# Patient Record
Sex: MALE | Race: WHITE | NOT HISPANIC OR LATINO | Employment: UNEMPLOYED | URBAN - METROPOLITAN AREA
[De-identification: names, ages, dates, MRNs, and addresses within clinical notes are randomized per-mention and may not be internally consistent; named-entity substitution may affect disease eponyms.]

---

## 2023-06-09 ENCOUNTER — APPOINTMENT (OUTPATIENT)
Dept: ULTRASOUND IMAGING | Facility: CLINIC | Age: 23
End: 2023-06-09
Attending: EMERGENCY MEDICINE
Payer: COMMERCIAL

## 2023-06-09 ENCOUNTER — APPOINTMENT (OUTPATIENT)
Dept: GENERAL RADIOLOGY | Facility: CLINIC | Age: 23
End: 2023-06-09
Attending: EMERGENCY MEDICINE
Payer: COMMERCIAL

## 2023-06-09 ENCOUNTER — APPOINTMENT (OUTPATIENT)
Dept: CT IMAGING | Facility: CLINIC | Age: 23
End: 2023-06-09
Attending: EMERGENCY MEDICINE
Payer: COMMERCIAL

## 2023-06-09 ENCOUNTER — HOSPITAL ENCOUNTER (INPATIENT)
Facility: CLINIC | Age: 23
LOS: 3 days | Discharge: HOME | End: 2023-06-13
Attending: EMERGENCY MEDICINE | Admitting: PSYCHIATRY & NEUROLOGY
Payer: COMMERCIAL

## 2023-06-09 DIAGNOSIS — S12.691A OTHER CLOSED NONDISPLACED FRACTURE OF SEVENTH CERVICAL VERTEBRA, INITIAL ENCOUNTER (H): ICD-10-CM

## 2023-06-09 DIAGNOSIS — F10.20 ALCOHOL USE DISORDER, SEVERE, DEPENDENCE (H): ICD-10-CM

## 2023-06-09 DIAGNOSIS — J34.89 DRY NOSE: ICD-10-CM

## 2023-06-09 DIAGNOSIS — F41.1 GAD (GENERALIZED ANXIETY DISORDER): ICD-10-CM

## 2023-06-09 DIAGNOSIS — F10.931 ALCOHOL WITHDRAWAL SYNDROME, WITH DELIRIUM (H): ICD-10-CM

## 2023-06-09 DIAGNOSIS — F51.05 INSOMNIA DUE TO MENTAL DISORDER: ICD-10-CM

## 2023-06-09 DIAGNOSIS — F33.1 MAJOR DEPRESSIVE DISORDER, RECURRENT EPISODE, MODERATE (H): Primary | ICD-10-CM

## 2023-06-09 DIAGNOSIS — F17.200 NICOTINE USE DISORDER: ICD-10-CM

## 2023-06-09 DIAGNOSIS — Z11.52 ENCOUNTER FOR SCREENING LABORATORY TESTING FOR SEVERE ACUTE RESPIRATORY SYNDROME CORONAVIRUS 2 (SARS-COV-2): ICD-10-CM

## 2023-06-09 DIAGNOSIS — R52 PAIN: ICD-10-CM

## 2023-06-09 DIAGNOSIS — K29.21 ALCOHOLIC GASTRITIS WITH HEMORRHAGE, UNSPECIFIED CHRONICITY: ICD-10-CM

## 2023-06-09 DIAGNOSIS — K13.79 MOUTH SORE: ICD-10-CM

## 2023-06-09 LAB
ABO/RH(D): NORMAL
ALBUMIN SERPL BCG-MCNC: 4.9 G/DL (ref 3.5–5.2)
ALCOHOL BREATH TEST: 0 (ref 0–0.01)
ALP SERPL-CCNC: 89 U/L (ref 40–129)
ALT SERPL W P-5'-P-CCNC: 13 U/L (ref 10–50)
AMPHETAMINES UR QL SCN: ABNORMAL
ANION GAP SERPL CALCULATED.3IONS-SCNC: 14 MMOL/L (ref 7–15)
ANTIBODY SCREEN: NEGATIVE
AST SERPL W P-5'-P-CCNC: 18 U/L (ref 10–50)
BARBITURATES UR QL SCN: ABNORMAL
BASOPHILS # BLD AUTO: 0 10E3/UL (ref 0–0.2)
BASOPHILS NFR BLD AUTO: 0 %
BENZODIAZ UR QL SCN: ABNORMAL
BILIRUB SERPL-MCNC: 0.3 MG/DL
BUN SERPL-MCNC: 9.9 MG/DL (ref 6–20)
BZE UR QL SCN: ABNORMAL
CALCIUM SERPL-MCNC: 9.7 MG/DL (ref 8.6–10)
CANNABINOIDS UR QL SCN: ABNORMAL
CHLORIDE SERPL-SCNC: 102 MMOL/L (ref 98–107)
CREAT SERPL-MCNC: 0.65 MG/DL (ref 0.67–1.17)
DEPRECATED HCO3 PLAS-SCNC: 23 MMOL/L (ref 22–29)
EOSINOPHIL # BLD AUTO: 0.1 10E3/UL (ref 0–0.7)
EOSINOPHIL NFR BLD AUTO: 2 %
ERYTHROCYTE [DISTWIDTH] IN BLOOD BY AUTOMATED COUNT: 11.8 % (ref 10–15)
FLUAV RNA SPEC QL NAA+PROBE: NEGATIVE
FLUBV RNA RESP QL NAA+PROBE: NEGATIVE
GFR SERPL CREATININE-BSD FRML MDRD: >90 ML/MIN/1.73M2
GLUCOSE SERPL-MCNC: 147 MG/DL (ref 70–99)
HCT VFR BLD AUTO: 47.1 % (ref 40–53)
HGB BLD-MCNC: 16.2 G/DL (ref 13.3–17.7)
IMM GRANULOCYTES # BLD: 0 10E3/UL
IMM GRANULOCYTES NFR BLD: 0 %
INR PPP: 1.07 (ref 0.85–1.15)
LIPASE SERPL-CCNC: 18 U/L (ref 13–60)
LYMPHOCYTES # BLD AUTO: 2.2 10E3/UL (ref 0.8–5.3)
LYMPHOCYTES NFR BLD AUTO: 23 %
MAGNESIUM SERPL-MCNC: 2 MG/DL (ref 1.7–2.3)
MCH RBC QN AUTO: 32.3 PG (ref 26.5–33)
MCHC RBC AUTO-ENTMCNC: 34.4 G/DL (ref 31.5–36.5)
MCV RBC AUTO: 94 FL (ref 78–100)
MONOCYTES # BLD AUTO: 0.5 10E3/UL (ref 0–1.3)
MONOCYTES NFR BLD AUTO: 5 %
NEUTROPHILS # BLD AUTO: 6.6 10E3/UL (ref 1.6–8.3)
NEUTROPHILS NFR BLD AUTO: 70 %
NRBC # BLD AUTO: 0 10E3/UL
NRBC BLD AUTO-RTO: 0 /100
OPIATES UR QL SCN: ABNORMAL
PLAT MORPH BLD: NORMAL
PLATELET # BLD AUTO: 218 10E3/UL (ref 150–450)
POTASSIUM SERPL-SCNC: 3.4 MMOL/L (ref 3.4–5.3)
PROT SERPL-MCNC: 8.2 G/DL (ref 6.4–8.3)
RBC # BLD AUTO: 5.02 10E6/UL (ref 4.4–5.9)
RBC MORPH BLD: NORMAL
RSV RNA SPEC NAA+PROBE: NEGATIVE
SARS-COV-2 RNA RESP QL NAA+PROBE: NEGATIVE
SODIUM SERPL-SCNC: 139 MMOL/L (ref 136–145)
SPECIMEN EXPIRATION DATE: NORMAL
WBC # BLD AUTO: 9.6 10E3/UL (ref 4–11)

## 2023-06-09 PROCEDURE — 80307 DRUG TEST PRSMV CHEM ANLYZR: CPT | Performed by: EMERGENCY MEDICINE

## 2023-06-09 PROCEDURE — 83690 ASSAY OF LIPASE: CPT | Performed by: EMERGENCY MEDICINE

## 2023-06-09 PROCEDURE — 72072 X-RAY EXAM THORAC SPINE 3VWS: CPT

## 2023-06-09 PROCEDURE — 86850 RBC ANTIBODY SCREEN: CPT | Performed by: EMERGENCY MEDICINE

## 2023-06-09 PROCEDURE — 72125 CT NECK SPINE W/O DYE: CPT

## 2023-06-09 PROCEDURE — 76705 ECHO EXAM OF ABDOMEN: CPT

## 2023-06-09 PROCEDURE — 72100 X-RAY EXAM L-S SPINE 2/3 VWS: CPT

## 2023-06-09 PROCEDURE — 99207 PR SERVICE NOT STAFFED W/SUPERV PROV: CPT | Performed by: NEUROLOGICAL SURGERY

## 2023-06-09 PROCEDURE — 85610 PROTHROMBIN TIME: CPT | Performed by: EMERGENCY MEDICINE

## 2023-06-09 PROCEDURE — 96365 THER/PROPH/DIAG IV INF INIT: CPT

## 2023-06-09 PROCEDURE — 80053 COMPREHEN METABOLIC PANEL: CPT | Performed by: EMERGENCY MEDICINE

## 2023-06-09 PROCEDURE — 83735 ASSAY OF MAGNESIUM: CPT | Performed by: EMERGENCY MEDICINE

## 2023-06-09 PROCEDURE — 71046 X-RAY EXAM CHEST 2 VIEWS: CPT

## 2023-06-09 PROCEDURE — 82075 ASSAY OF BREATH ETHANOL: CPT

## 2023-06-09 PROCEDURE — 99285 EMERGENCY DEPT VISIT HI MDM: CPT | Performed by: EMERGENCY MEDICINE

## 2023-06-09 PROCEDURE — 99285 EMERGENCY DEPT VISIT HI MDM: CPT | Mod: 25

## 2023-06-09 PROCEDURE — C9803 HOPD COVID-19 SPEC COLLECT: HCPCS

## 2023-06-09 PROCEDURE — 90791 PSYCH DIAGNOSTIC EVALUATION: CPT

## 2023-06-09 PROCEDURE — 93005 ELECTROCARDIOGRAM TRACING: CPT

## 2023-06-09 PROCEDURE — 70450 CT HEAD/BRAIN W/O DYE: CPT

## 2023-06-09 PROCEDURE — 86901 BLOOD TYPING SEROLOGIC RH(D): CPT | Performed by: EMERGENCY MEDICINE

## 2023-06-09 PROCEDURE — 96375 TX/PRO/DX INJ NEW DRUG ADDON: CPT

## 2023-06-09 PROCEDURE — 250N000011 HC RX IP 250 OP 636: Performed by: EMERGENCY MEDICINE

## 2023-06-09 PROCEDURE — 85025 COMPLETE CBC W/AUTO DIFF WBC: CPT | Performed by: EMERGENCY MEDICINE

## 2023-06-09 PROCEDURE — C9113 INJ PANTOPRAZOLE SODIUM, VIA: HCPCS | Performed by: EMERGENCY MEDICINE

## 2023-06-09 PROCEDURE — 36415 COLL VENOUS BLD VENIPUNCTURE: CPT | Performed by: EMERGENCY MEDICINE

## 2023-06-09 PROCEDURE — 87637 SARSCOV2&INF A&B&RSV AMP PRB: CPT | Performed by: EMERGENCY MEDICINE

## 2023-06-09 PROCEDURE — 250N000009 HC RX 250: Performed by: EMERGENCY MEDICINE

## 2023-06-09 PROCEDURE — 250N000013 HC RX MED GY IP 250 OP 250 PS 637: Performed by: EMERGENCY MEDICINE

## 2023-06-09 RX ORDER — DIAZEPAM 5 MG
5-20 TABLET ORAL EVERY 30 MIN PRN
Status: DISCONTINUED | OUTPATIENT
Start: 2023-06-09 | End: 2023-06-10

## 2023-06-09 RX ADMIN — DIAZEPAM 10 MG: 5 TABLET ORAL at 17:15

## 2023-06-09 RX ADMIN — NICOTINE POLACRILEX 4 MG: 4 GUM, CHEWING BUCCAL at 17:48

## 2023-06-09 RX ADMIN — FOLIC ACID: 5 INJECTION, SOLUTION INTRAMUSCULAR; INTRAVENOUS; SUBCUTANEOUS at 17:48

## 2023-06-09 RX ADMIN — PANTOPRAZOLE SODIUM 40 MG: 40 INJECTION, POWDER, FOR SOLUTION INTRAVENOUS at 17:15

## 2023-06-09 RX ADMIN — NICOTINE POLACRILEX 4 MG: 4 GUM, CHEWING BUCCAL at 19:40

## 2023-06-09 ASSESSMENT — COLUMBIA-SUICIDE SEVERITY RATING SCALE - C-SSRS
LETHALITY/MEDICAL DAMAGE CODE MOST LETHAL ACTUAL ATTEMPT: MINOR PHYSICAL DAMAGE
1. HAVE YOU WISHED YOU WERE DEAD OR WISHED YOU COULD GO TO SLEEP AND NOT WAKE UP?: YES
2. HAVE YOU ACTUALLY HAD ANY THOUGHTS OF KILLING YOURSELF?: YES
5. HAVE YOU STARTED TO WORK OUT OR WORKED OUT THE DETAILS OF HOW TO KILL YOURSELF? DO YOU INTEND TO CARRY OUT THIS PLAN?: YES
ATTEMPT LIFETIME: YES
LETHALITY/MEDICAL DAMAGE CODE MOST RECENT POTENTIAL ATTEMPT: BEHAVIOR LIKELY TO RESULT IN INJURY BUT NOT LIKELY TO CAUSE DEATH
LETHALITY/MEDICAL DAMAGE CODE FIRST POTENTIAL ATTEMPT: BEHAVIOR LIKELY TO RESULT IN INJURY BUT NOT LIKELY TO CAUSE DEATH
REASONS FOR IDEATION PAST MONTH: DOES NOT APPLY
ATTEMPT PAST THREE MONTHS: NO
2. HAVE YOU ACTUALLY HAD ANY THOUGHTS OF KILLING YOURSELF?: YES
LETHALITY/MEDICAL DAMAGE CODE FIRST ACTUAL ATTEMPT: MINOR PHYSICAL DAMAGE
4. HAVE YOU HAD THESE THOUGHTS AND HAD SOME INTENTION OF ACTING ON THEM?: YES
5. HAVE YOU STARTED TO WORK OUT OR WORKED OUT THE DETAILS OF HOW TO KILL YOURSELF? DO YOU INTEND TO CARRY OUT THIS PLAN?: NO
3. HAVE YOU BEEN THINKING ABOUT HOW YOU MIGHT KILL YOURSELF?: YES
LETHALITY/MEDICAL DAMAGE CODE MOST RECENT ACTUAL ATTEMPT: MINOR PHYSICAL DAMAGE
1. IN THE PAST MONTH, HAVE YOU WISHED YOU WERE DEAD OR WISHED YOU COULD GO TO SLEEP AND NOT WAKE UP?: YES
REASONS FOR IDEATION LIFETIME: MOSTLY TO END OR STOP THE PAIN (YOU COULDN'T GO ON LIVING WITH THE PAIN OR HOW YOU WERE FEELING)
6. HAVE YOU EVER DONE ANYTHING, STARTED TO DO ANYTHING, OR PREPARED TO DO ANYTHING TO END YOUR LIFE?: NO
TOTAL  NUMBER OF INTERRUPTED ATTEMPTS LIFETIME: NO
TOTAL  NUMBER OF ABORTED OR SELF INTERRUPTED ATTEMPTS LIFETIME: NO
4. HAVE YOU HAD THESE THOUGHTS AND HAD SOME INTENTION OF ACTING ON THEM?: NO
LETHALITY/MEDICAL DAMAGE CODE MOST LETHAL POTENTIAL ATTEMPT: BEHAVIOR LIKELY TO RESULT IN INJURY BUT NOT LIKELY TO CAUSE DEATH

## 2023-06-09 ASSESSMENT — ACTIVITIES OF DAILY LIVING (ADL)
ADLS_ACUITY_SCORE: 33
ADLS_ACUITY_SCORE: 35
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
## 2023-06-09 NOTE — ED TRIAGE NOTES
Triage Assessment     Row Name 06/09/23 1600       Triage Assessment (Adult)    Airway WDL WDL       Respiratory WDL    Respiratory WDL WDL       Skin Circulation/Temperature WDL    Skin Circulation/Temperature WDL WDL       Cardiac WDL    Cardiac WDL WDL       Peripheral/Neurovascular WDL    Peripheral Neurovascular WDL WDL       Cognitive/Neuro/Behavioral WDL    Cognitive/Neuro/Behavioral WDL WDL

## 2023-06-09 NOTE — ED PROVIDER NOTES
[facility] ([facility])    6/09/23      ED PROVIDER NOTE   Hallway E   History     Chief Complaint   Patient presents with     Alcohol Intoxication     Pt is interested of detox. Pt 's last drink was last night  around 12:30 am or 1 am. Pt drank beer last night. Pt has history of passing out last year.      HPI  Sahil Tejeda is a 23 year old male with history of alcohol use, homelessness, prior chlamydia infection who presents seeking alcohol detox.  Patient has been drinking alcohol on a daily basis.  Patient states that he last drank last night around 12:30 AM-1 AM.     Epic records reviewed, he did have [facility] evaluation on 6/6/2023, 3 days ago for sore throat and STI check.  Patient was diagnosed with community-acquired pneumonia of the left lung with sore throat.  Patient was prescribed Levaquin 750 mg daily x5-day course (to be completed by 6/11/2023), albuterol nebulizer, acetaminophen, saline nasal spray.    Sahil Tejeda is a 23-year-old male with history of alcohol dependence who presents to the emergency department seeking detox.  He reports he has been drinking approximately eight 40 ounce beers a day.  He last drank last night.  He has been drinking on a daily basis for approximately 4 years.  He denies any history of withdrawal seizures or hallucinations.  He does report he is smoking marijuana regularly as well.  Denies any IV drug use.  He did have a fall approximately 2 weeks ago when he was in a tree and fell out of the tree onto his head.  He fell approximately 6-7 feet. He did not have a loss of consciousness. He does report an ongoing headache, nausea and vomiting, along with generalized neck discomfort. He reports chronic back pain and has not had significant changes.   He denies any extremity pain from the fall.  He does report over the last 2 weeks he has had a worsening cough and was seen in clinic and diagnosed with pneumonia.  He was  "started on levofloxacin.  He denies any recent fevers or chest pain.  He does report some slight shortness of breath.  Additionally he reports chronic abdominal pain that has been present for a number of years.  Pain is generalized, significant in the upper abdomen.  He does have a history of ulcers and prior GI bleed.  He reports intermittent dark stools for the last few years.  He is not anticoagulated.  He denies any NSAID use.  Additionally he reports depression and anxiety.  He has had intermittent receiving he has had longstanding thoughts of wanting to harm himself does not have any specific plan in mind.      This part of the medical record was transcribed by Adolfo Mcmillan, Medical Scribe, from a dictation done by Darcie Foote MD.       Past Medical History  No past medical history on file.  No past surgical history on file.  No current outpatient medications on file.    Allergies   Allergen Reactions     Omnicef [Cefdinir] Hives     Family History  No family history on file.  Social History       Past medical history, past surgical history, medications, allergies, family history, and social history were reviewed with the patient. No additional pertinent items.        Physical Exam   BP: 136/84  Pulse: 111  Temp: 98.6  F (37  C)  Resp: 16  Height: 188 cm (6' 2\")  SpO2: 94 %  Physical Exam  General: patient is alert and oriented and in no acute distress   Head: atraumatic and normocephalic   EENT: moist mucus membranes without tonsillar erythema or exudates, pupils 3 mm, equal round and reactive, extraocular movements intact  Neck: supple, very slight lower cervical spine tenderness to palpation  Cardiovascular: regular rate and rhythm, extremities warm and well perfused, no lower extremity edema  Pulmonary: Coarse breath sounds on the left  Abdomen: soft, epigastric tenderness to palpation without rebound or guarding  Musculoskeletal: normal range of motion, generalized tenderness to palpation of the thoracic " and lumbar spine, no point tenderness to palpation of the extremities  Neurological: alert and oriented, moving all extremities symmetrically, CN II-XII intact, strength 5/5 and symmetric in , elbow flexion/extension, hip flexion/extension, knee flexion/extension and ankle plantar/dorsiflexion, sensation to light touch in distal upper and lower extremities intact, normal gait  Skin: warm, dry     ED Course, Procedures, & Data      Procedures       ED Course Selections:        EKG Interpretation:      Interpreted by Darcie Monae MD  Time reviewed: 1648  Symptoms at time of EKG: None   Rhythm: normal sinus   Rate: Normal  Axis: Right Axis Deviation  Ectopy: none  Conduction: right bundle branch block (incomplete)  ST Segments/ T Waves: No acute ischemic changes  Q Waves: none  Comparison to prior: No old EKG available    Clinical Impression: non-specific EKG                           No results found for any visits on 06/09/23.  Medications - No data to display  Labs Ordered and Resulted from Time of ED Arrival to Time of ED Departure - No data to display  No orders to display          Critical care was not performed.     Medical Decision Making  The patient's presentation was of high complexity (a chronic illness severe exacerbation, progression, or side effect of treatment).    The patient's evaluation involved:  ordering and/or review of 3+ test(s) in this encounter (see separate area of note for details)  discussion of management or test interpretation with another health professional (neurosurgery,trauma surgery)    The patient's management necessitated moderate risk (prescription drug management including medications given in the ED) and high risk (a decision regarding hospitalization).      Assessment & Plan    Sahil Tejeda is a 23-year-old male with history of alcohol dependence who presents to the emergency department seeking detox.  He has noted to be tachycardic, symptomatically stable,  afebrile and in no respiratory distress.  Alcohol level is currently 0.  He was started on MSSA protocol.  He was given a banana bag.  Additionally he reports a recent 6 to 7 foot fall from a tree.  He did have a CT head and C-spine as well as x-rays of the thoracic spine.  No other point bony tenderness affects his extremities.  On exam he is neurologically intact.  CT head is unremarkable.  CT of the cervical spine does show a C7 fracture.  Patient was seen and evaluated by neurosurgery and recommended cervical MRI.  MRI obtained.  Neurosurgery has recommended that he remain in the cervical collar during the day for comfort.  He remove the collar when sleeping and eating.  X-rays of the thoracic and lumbar spine are unremarkable.  Discussed with trauma surgery and recommended considering CTA however on discussion with neurosurgery is not needed based on the location of the fracture.  Trauma surgery has not recommended any further work-up.    He reports a worsening cough recently and was diagnosed with pneumonia.  His chest x-ray today shows no focal infiltrate.  Influenza and COVID are negative.  He has no leukocytosis.  He is not hypoxic and in no respiratory distress.  He was started on levofloxacin 2 days ago and will plan to continue his oral antibiotic.      Additionally he reports constant abdominal pain for many months as well as dark stools.  His laboratory evaluation demonstrates normal hemoglobin of 16.2.  He has no elevation in his LFTs or lipase.  Right upper quadrant ultrasound is unremarkable.  He was given IV Protonix.  Low suspicion for significant GI bleed at this time.    Finally he reports depression, anxiety and suicidal ideation without a specific plan in mind.  He did have a [facility] evaluation and recommended proceeding with detox and chemical dependency resources as well as outpatient therapy.  And is able to contract for safety at this time.  He will be admitted voluntarily to detox for further  management.      This part of the medical record was transcribed by Adolfo Mcmillan Medical Scribe, from a dictation done by Darcie Foote MD.     I have reviewed the nursing notes. I have reviewed the findings, diagnosis, plan and need for follow up with the patient.    New Prescriptions    No medications on file       Final diagnoses:   Alcohol withdrawal syndrome, with delirium (H)   Other closed nondisplaced fracture of seventh cervical vertebra, initial encounter (H)       I, Adolfo Mcmillan, am serving as a trained medical scribe to document services personally performed by Darcie Foote MD based on the provider's statements to me on June 9, 2023.  This document has been checked and approved by the attending provider.    I, Darcie Foote MD, was physically present and have reviewed and verified the accuracy of this note documented by Adolfo Mcmillan medical scribe.      Darcie Foote MD  [facility] EMERGENCY DEPARTMENT  6/9/2023     Darcie Foote MD  06/10/23 0153

## 2023-06-09 NOTE — DISCHARGE INSTRUCTIONS
Behavioral Discharge Planning and Instructions  THANK YOU FOR CHOOSING THE [facility]  3A  [phone]    Summary: You were admitted to Station 3A on 6/9/2023 for detoxification from alcohol.  A medical exam was performed that included lab work. You have met with a  and opted to return home.  Please take care and make your recovery a priority, Sahil!    Recommendation:   Utilize resources listed below and develop a positive support system. Patients recommendation is to discharge home. Follow up with residential referrals listed below if the decision is to attend them. Below are 2 scheduled appointments for you.      Type: Teletherapy  Date: Wednesday, 6/14/2023    Time: 12:00 pm - 1:00 pm  Provider: Madhavi Haney  Location: [facility], [address] [place] [address] [address], [place] [address]  Phone: [phone]  NOTES:  Please call our at [phone] and confirm their appointment and leave their email.      Type: Telepsychiatry  Date: Thursday, 6/15/2023  Time: 1:00 pm - 2:00 pm  Provider: Refugio Armstrong  MSN  CNP,PMHNP,RN  Location: [facility], [address] [place] [address] Eduardo Chance, [place] [address]  Phone: [phone]  NOTES: You will be contacted by our office to set up the virtual meeting. If you have questions, please contact our office at [phone].      Main Diagnosis: Per Dr. Cr [place], [place]  303.90 (F10.20) Alcohol Use Disorder Severe    Major Treatments, Procedures and Findings:  You were detoxed from alcohol with the Modified Selective Severity Protocol using Valium. You have met with a  to develop a treatment plan for discharge.  You have had labs drawn and a copy of those labs will be sent home with you.  Please bring your lab results with to your follow up doctor appointment.    Symptoms to Report:  If you experience more anxiety, confusion, sleeplessness, deep sadness or thoughts of suicide, notify your treatment team or notify your  "primary care physician. IF ANY OF THE SYMPTOMS YOU ARE EXPERIENCING ARE A MEDICAL EMERGENCY CALL 911 IMMEDIATELY.     If you or someone you know is struggling or in crisis, help is available.  Call or text 248 or chat  at [employer]    Lifestyle Adjustment: Adjust your lifestyle to get enough sleep, relaxation, exercise and  good nutrition. Continue to develop healthy coping skills to decrease stress and promote a sober living environment. Do not use alcohol, illegal drugs or addictive medications other than what is currently prescribed. AA, NA, and  Sponsor are excellent resources for support.     Primary Provider:    Disposition: Home    Treatment Follow-Up: Below are a few program options to consider if you decide to pursue residential treatment. Contact them for next steps:   [facility]  Phone: [phone]  About: \"[facility] patients receive quality care for alcohol and/or drug use in a living environment that is conducive to their recovery. Our residents receive 30 hours of service per week, including group therapy, individual counseling, spiritual care and education sessions. Each patient is assigned to a primary counselor and therapy group for the duration of their stay. Shortly after admission to [facility], the patient will work with a counselor to develop a personalized treatment plan. This plan outlines treatment goals that, when reached, will determine the next steps in the recovery process.\"     [facility]  Email: [email or web address]  P: [phone] F: [phone]  About: [facility] ([facility]) is a behavioral health organization dedicated to helping people with mental health and substance use disorders Move Forward in Hope. [facility] provides Residential and Intensive Outpatient Co-Occurring Disorders Treatment, Individual and Intensive Outpatient (IOP) mental health counseling services, chemical dependency treatment, and sober housing.  " "    [facility]  Address: [facility] [place] [address]  Phone: [phone]  Fax: [phone]   About: \"[facility] s Program provides a safe, sober, structured environment for men while in the process of putting their lives back together. Residential clients receive comprehensive, intensive substance use and mental health treatment that includes one-on-one counseling, individualized treatment planning, group therapy, family programming, medication monitoring, case management, and community referrals.\"     Facts about COVID19 at www.cdc.gov/COVID19 and www.[place].gov/covid19    Keeping hands clean is one of the most important steps we can take to avoid getting sick and spreading germs to others.  Please wash your hands frequently and lather with soap for at least 20 seconds!    Follow-up Appointment:   Appointment Date/Time: 6/27 2:10PM    Psychiatrist/Primary Care Giver: [facility]    Address: [address] [place] [address] [address], [place] [address]    Phone Number: [phone]        Type: Teletherapy  Date: Wednesday, 6/14/2023    Time: 12:00 pm - 1:00 pm  Provider: Madhavi Haney  Location: [facility], [address] [place] [address]  Phone: [phone]      Type: Telepsychiatry  Date: Thursday, 6/15/2023  Time: 1:00 pm - 2:00 pm  Provider: Refugio Armstrong  MSN  CNP,PMHNP,RN  Location: [facility], [address] [place] [address] [place] [address]  Phone: [phone]    Resources:   Resources for on line recovery meetings:  AA meetings can be found online; search for them at: http://aa-intergroup.org/directory.php  AA meetings via Z[place]OM for [place] area can be found online at: https://a[place].org/find-a-meeting/holiday-closings/  NA meetings via ZOOM for [place] area can be found online at: https://sites.Stocard.com/view/mnregionofnarcoticsanonymous/home?authuser=2  Www.qualifyoren.org  has online resources for meeting and recovery care including " "Podcast \"Let's Talk:Addiction & Recovery Podcasts  Www.[place]recovery.org     DISCHARGE RESOURCES:  -SMART Recovery - self management for addiction recovery:  www.smartrecovery.org    -Pathways ~ A Health Crisis Resource & Support Center: 432.486.8280.  -[facility] 614-506-9418   -[facility] 621-577-6400 or 185-709-6035.  -Suicide Awareness Voices of Education (SAVE) (www.save.org): 143-318-ROOU (7423)  -National Suicide Prevention Line (www.mentalhealthmn.org): 068-174-DQBY (5625)  -National [place] on Mental Illness (www.[place].sai.org): 149.515.2468 or 296-727-2732.  -Tfak4flqj: text the word LIFE to 33683 for immediate support and crisis intervention  -Mental Health Consumer/Survivor Network of [place] (www.mhcsn.net): 993.519.5696 or 884-643-3635  -Mental Health Association of [place] (www.mentalhealth.org): 498.783.5238 or 288-714-4626     -Substance Abuse and Mental Health Services (www.samhsa.gov)  -Harm Reduction Coalition (www. Harmreduction.org)  -www.prescribetoprevent.org or http://prescribetoprevent.org/video  -Poison control 7-765-062-9328   **[place] Opioid Prevention Coalition: www.opioidcoalition.org    Sober Support Group Information:  AA/NA & Sponsor/Support  -Alcoholics Anonymous (www.alcoholics-anonymous.org): for local information 24 hours/day  -AA Intergroup service office in [place] (http://www.aastpaul.org/) 237.101.2480  -AA Intergroup service office in [facility]: [phone]. (http://www.aamin[place].org/)  -Narcotics Anonymous (www.naminn[place].org) (179) 919-7741   **Sober Fun Activities: www.soberFutura Medicalactivities.[employer]/[facility]//[facility] Recovery Connection (MRC)  [facility] connects people seeking recovery to resources that help foster and sustain long-term recovery.  Whether you are seeking resources for treatment, transportation, housing, job training, education, health care or other pathways to recovery, [facility] is a great place to " start.    Phone: (614) 394-3266.  www.minnesotaTickTickTickets.Vivino (Great listing of all types of recovery and non-recovery related resources)      General Medication Instructions:   See your medication sheet(s) for instructions.   Take all medicines as directed.  Make no changes unless your doctor suggests them.   Go to all your doctor visits.  Be sure to have all your required lab tests. This way, your medicines can be refilled on time.  Do not use any drugs not prescribed by your provider.  AA/NA and Sponsors are excellent resources for support  Avoid alcohol.    Any follow up concerns:  Nursing questions call the Unit -[facility] [phone]  Medical Record call [phone]  Outpatient Behavioral Intake call [phone]  LP+ Wait List/Bed Availability call [phone]    The entire treatment team has appreciated the opportunity to work with you Sahil.  We wish you the best in the future and with your lifelong recovery goals. Please bring this discharge folder with you to all follow up appointments.  It contains your lab results, diagnosis, medication list and discharge recommendations.    Food Resources:  There is a list of resources to local food shelves included in the patients property.    THANK YOU FOR CHOOSING THE [facility]

## 2023-06-09 NOTE — PLAN OF CARE
Sahil Tejeda  June 9, 2023  Plan of Care Hand-off Note     Patient Care Path: Detox    Plan for Care:     After therapeutic assessment and intervention by ED care team and LM and in consultation with attending provider, the patient's circumstances and mental state were appropriate for detox with MICD bed placement. Pt presents to [facility] ED this evening reporting daily heavy alcohol use for the past 4 years, approximately 8, 40 oz beers per day with his most recent drink being last night at approximately 12:30 am - 1 am. Denies hx of withdrawal seizures or DTs. Pt endorses passive SI, no plan or intent and feels he can be safe on the detox unit. It is the recommendation of this clinician that pt complete a OLIMPIA assessment and follow up with CD treatment following detox. He is also very interested in seeing a therapist following treatment.     Critical Safety Issues: passive SI    Overview:  This patient is a child/adolescent: No    This patient has additional special visitor precautions: No    Legal Status: Voluntary    Contacts:     Keily (, [employer]link) [phone]    Psychiatry Consult:  Psychiatry Consult not requested because pt declined    Updated RN and Attending Provider regarding plan of care.    ITALO Ramos

## 2023-06-10 ENCOUNTER — APPOINTMENT (OUTPATIENT)
Dept: GENERAL RADIOLOGY | Facility: CLINIC | Age: 23
End: 2023-06-10
Attending: STUDENT IN AN ORGANIZED HEALTH CARE EDUCATION/TRAINING PROGRAM
Payer: COMMERCIAL

## 2023-06-10 ENCOUNTER — APPOINTMENT (OUTPATIENT)
Dept: MRI IMAGING | Facility: CLINIC | Age: 23
End: 2023-06-10
Attending: EMERGENCY MEDICINE
Payer: COMMERCIAL

## 2023-06-10 PROBLEM — F10.939 ALCOHOL WITHDRAWAL (H): Status: ACTIVE | Noted: 2023-06-10

## 2023-06-10 LAB
ATRIAL RATE - MUSE: 98 BPM
DIASTOLIC BLOOD PRESSURE - MUSE: NORMAL MMHG
INTERPRETATION ECG - MUSE: NORMAL
P AXIS - MUSE: 79 DEGREES
PR INTERVAL - MUSE: 132 MS
QRS DURATION - MUSE: 102 MS
QT - MUSE: 344 MS
QTC - MUSE: 439 MS
R AXIS - MUSE: 104 DEGREES
SYSTOLIC BLOOD PRESSURE - MUSE: NORMAL MMHG
T AXIS - MUSE: 60 DEGREES
VENTRICULAR RATE- MUSE: 98 BPM

## 2023-06-10 PROCEDURE — 99221 1ST HOSP IP/OBS SF/LOW 40: CPT | Performed by: PHYSICIAN ASSISTANT

## 2023-06-10 PROCEDURE — 250N000011 HC RX IP 250 OP 636: Performed by: PSYCHIATRY & NEUROLOGY

## 2023-06-10 PROCEDURE — 72040 X-RAY EXAM NECK SPINE 2-3 VW: CPT

## 2023-06-10 PROCEDURE — 72040 X-RAY EXAM NECK SPINE 2-3 VW: CPT | Mod: 26 | Performed by: RADIOLOGY

## 2023-06-10 PROCEDURE — 128N000004 HC R&B CD ADULT

## 2023-06-10 PROCEDURE — 250N000013 HC RX MED GY IP 250 OP 250 PS 637: Performed by: PHYSICIAN ASSISTANT

## 2023-06-10 PROCEDURE — 99223 1ST HOSP IP/OBS HIGH 75: CPT | Mod: AI | Performed by: PSYCHIATRY & NEUROLOGY

## 2023-06-10 PROCEDURE — 72141 MRI NECK SPINE W/O DYE: CPT

## 2023-06-10 PROCEDURE — HZ2ZZZZ DETOXIFICATION SERVICES FOR SUBSTANCE ABUSE TREATMENT: ICD-10-PCS | Performed by: PSYCHIATRY & NEUROLOGY

## 2023-06-10 PROCEDURE — 250N000013 HC RX MED GY IP 250 OP 250 PS 637: Performed by: PSYCHIATRY & NEUROLOGY

## 2023-06-10 RX ORDER — MULTIPLE VITAMINS W/ MINERALS TAB 9MG-400MCG
1 TAB ORAL DAILY
Status: DISCONTINUED | OUTPATIENT
Start: 2023-06-10 | End: 2023-06-13 | Stop reason: HOSPADM

## 2023-06-10 RX ORDER — DIAZEPAM 5 MG
5-20 TABLET ORAL EVERY 30 MIN PRN
Status: DISCONTINUED | OUTPATIENT
Start: 2023-06-10 | End: 2023-06-13 | Stop reason: HOSPADM

## 2023-06-10 RX ORDER — LEVOFLOXACIN 750 MG/1
750 TABLET, FILM COATED ORAL DAILY
Status: DISCONTINUED | OUTPATIENT
Start: 2023-06-10 | End: 2023-06-10

## 2023-06-10 RX ORDER — ACETAMINOPHEN 325 MG/1
325-650 TABLET ORAL EVERY 6 HOURS PRN
Status: ON HOLD | COMMUNITY
End: 2023-06-13

## 2023-06-10 RX ORDER — ATENOLOL 50 MG/1
50 TABLET ORAL DAILY PRN
Status: DISCONTINUED | OUTPATIENT
Start: 2023-06-10 | End: 2023-06-13 | Stop reason: HOSPADM

## 2023-06-10 RX ORDER — OLANZAPINE 5 MG/1
5 TABLET, ORALLY DISINTEGRATING ORAL 4 TIMES DAILY PRN
Status: DISCONTINUED | OUTPATIENT
Start: 2023-06-10 | End: 2023-06-13 | Stop reason: HOSPADM

## 2023-06-10 RX ORDER — POLYETHYLENE GLYCOL 3350 17 G
2 POWDER IN PACKET (EA) ORAL
Status: DISCONTINUED | OUTPATIENT
Start: 2023-06-10 | End: 2023-06-13 | Stop reason: HOSPADM

## 2023-06-10 RX ORDER — FOLIC ACID 1 MG/1
1 TABLET ORAL DAILY
Status: DISCONTINUED | OUTPATIENT
Start: 2023-06-10 | End: 2023-06-13 | Stop reason: HOSPADM

## 2023-06-10 RX ORDER — QUETIAPINE FUMARATE 25 MG/1
25-50 TABLET, FILM COATED ORAL 3 TIMES DAILY PRN
Status: DISCONTINUED | OUTPATIENT
Start: 2023-06-10 | End: 2023-06-11

## 2023-06-10 RX ORDER — LEVOFLOXACIN 750 MG/1
750 TABLET, FILM COATED ORAL DAILY
Status: ON HOLD | COMMUNITY
Start: 2023-06-06 | End: 2023-06-13

## 2023-06-10 RX ORDER — ONDANSETRON 4 MG/1
4 TABLET, FILM COATED ORAL EVERY 6 HOURS PRN
Status: DISCONTINUED | OUTPATIENT
Start: 2023-06-10 | End: 2023-06-13 | Stop reason: HOSPADM

## 2023-06-10 RX ORDER — HYDROXYZINE HYDROCHLORIDE 50 MG/1
50 TABLET, FILM COATED ORAL EVERY 4 HOURS PRN
Status: DISCONTINUED | OUTPATIENT
Start: 2023-06-10 | End: 2023-06-13 | Stop reason: HOSPADM

## 2023-06-10 RX ORDER — ACYCLOVIR 50 MG/G
OINTMENT TOPICAL
Status: DISCONTINUED | OUTPATIENT
Start: 2023-06-10 | End: 2023-06-13 | Stop reason: HOSPADM

## 2023-06-10 RX ORDER — PROCHLORPERAZINE MALEATE 5 MG
5 TABLET ORAL EVERY 6 HOURS PRN
Status: DISCONTINUED | OUTPATIENT
Start: 2023-06-10 | End: 2023-06-13 | Stop reason: HOSPADM

## 2023-06-10 RX ORDER — TRAZODONE HYDROCHLORIDE 50 MG/1
50 TABLET, FILM COATED ORAL
Status: DISCONTINUED | OUTPATIENT
Start: 2023-06-10 | End: 2023-06-13 | Stop reason: HOSPADM

## 2023-06-10 RX ORDER — ALBUTEROL SULFATE 0.83 MG/ML
1.25 SOLUTION RESPIRATORY (INHALATION) EVERY 6 HOURS PRN
Status: DISCONTINUED | OUTPATIENT
Start: 2023-06-10 | End: 2023-06-13 | Stop reason: HOSPADM

## 2023-06-10 RX ORDER — PANTOPRAZOLE SODIUM 40 MG/1
40 TABLET, DELAYED RELEASE ORAL
Status: DISCONTINUED | OUTPATIENT
Start: 2023-06-11 | End: 2023-06-13 | Stop reason: HOSPADM

## 2023-06-10 RX ORDER — ALBUTEROL SULFATE 1.25 MG/3ML
1.25 SOLUTION RESPIRATORY (INHALATION) EVERY 6 HOURS PRN
Status: ON HOLD | COMMUNITY
End: 2023-06-13

## 2023-06-10 RX ORDER — LEVOFLOXACIN 500 MG/1
500 TABLET, FILM COATED ORAL DAILY
Status: DISCONTINUED | OUTPATIENT
Start: 2023-06-10 | End: 2023-06-10

## 2023-06-10 RX ORDER — LEVOFLOXACIN 750 MG/1
750 TABLET, FILM COATED ORAL DAILY
Status: COMPLETED | OUTPATIENT
Start: 2023-06-10 | End: 2023-06-11

## 2023-06-10 RX ORDER — ACETAMINOPHEN 325 MG/1
325-650 TABLET ORAL EVERY 6 HOURS PRN
Status: DISCONTINUED | OUTPATIENT
Start: 2023-06-10 | End: 2023-06-13 | Stop reason: HOSPADM

## 2023-06-10 RX ADMIN — MULTIPLE VITAMINS W/ MINERALS TAB 1 TABLET: TAB at 08:57

## 2023-06-10 RX ADMIN — FOLIC ACID 1 MG: 1 TABLET ORAL at 08:57

## 2023-06-10 RX ADMIN — LEVOFLOXACIN 750 MG: 750 TABLET, FILM COATED ORAL at 12:02

## 2023-06-10 RX ADMIN — NICOTINE POLACRILEX 2 MG: 2 LOZENGE ORAL at 15:28

## 2023-06-10 RX ADMIN — NICOTINE POLACRILEX 2 MG: 2 LOZENGE ORAL at 13:26

## 2023-06-10 RX ADMIN — HYDROXYZINE HYDROCHLORIDE 50 MG: 50 TABLET, FILM COATED ORAL at 18:03

## 2023-06-10 RX ADMIN — THIAMINE HCL TAB 100 MG 100 MG: 100 TAB at 08:58

## 2023-06-10 RX ADMIN — ONDANSETRON 4 MG: 4 TABLET ORAL at 11:55

## 2023-06-10 ASSESSMENT — ACTIVITIES OF DAILY LIVING (ADL)
FALL_HISTORY_WITHIN_LAST_SIX_MONTHS: YES
ADLS_ACUITY_SCORE: 28
ADLS_ACUITY_SCORE: 35
ADLS_ACUITY_SCORE: 45
TOILETING_ISSUES: NO
ADLS_ACUITY_SCORE: 28
ADLS_ACUITY_SCORE: 28
LAUNDRY: WITH SUPERVISION
NUMBER_OF_TIMES_PATIENT_HAS_FALLEN_WITHIN_LAST_SIX_MONTHS: 1
WALKING_OR_CLIMBING_STAIRS_DIFFICULTY: NO
WEAR_GLASSES_OR_BLIND: NO
DRESS: INDEPENDENT
DRESS: INDEPENDENT
DIFFICULTY_EATING/SWALLOWING: NO
ORAL_HYGIENE: INDEPENDENT
ORAL_HYGIENE: INDEPENDENT
HYGIENE/GROOMING: INDEPENDENT
ADLS_ACUITY_SCORE: 28
ADLS_ACUITY_SCORE: 35
DRESSING/BATHING_DIFFICULTY: NO
DOING_ERRANDS_INDEPENDENTLY_DIFFICULTY: NO
HYGIENE/GROOMING: INDEPENDENT
ADLS_ACUITY_SCORE: 28
CONCENTRATING,_REMEMBERING_OR_MAKING_DECISIONS_DIFFICULTY: NO
HYGIENE/GROOMING: INDEPENDENT
DRESS: INDEPENDENT
ORAL_HYGIENE: INDEPENDENT
LAUNDRY: WITH SUPERVISION
ADLS_ACUITY_SCORE: 28
CHANGE_IN_FUNCTIONAL_STATUS_SINCE_ONSET_OF_CURRENT_ILLNESS/INJURY: NO

## 2023-06-10 NOTE — PROGRESS NOTES
Case Management:     Writer checked-in and introduced role to pt's care and how to assist pt during their stay. Inquired with pt about what they are needing during their stay and what they are considering for their aftercare plans. Pt processed that he is looking for a 30-45 day inpatient treatment program. Pt reports that he has not participated in any inpatient programs for substance use however has a history of prior  inpatient/hospitliaizations. Writer directed pt to complete the assessment paper work in his folder noting an assessment is needed for a referral. Pt was agreeable to work on getting it completed tonight       Next Steps and Discharge Plan or Goal: CM to follow up with pt tomorrow regarding completion of his assessment paper work and completing his OLIMPIA assessment. Pt is wanting referrals to Inpatient treatment and referrals will be placed once an assessment is complete. Continue with stabilization and aftercare coordination      Amanda Moseley, ISHMAELC, LPCC

## 2023-06-10 NOTE — PLAN OF CARE
S: The Patient came as MICD  Pt from [place] ED into room 329-1 on 6/10/2023 at 0425. The Pt is seeking Detox from Alcohol. Pt is here voluntarily. Dr. Brar will care for the pt.     B: Per ED notes, RN-to-RN report, and the assessment interview, Sahil Tejeda is a 23-year-old male with a history of alcohol dependence who presents to the emergency department seeking detox. Currently reports drinking 8 40oz beers per day for about four years with brief periods of sobriety. Additionally, he says he had a recent 6 to 7-foot fall from a tree two weeks ago on his head and neck. He did have a CT head and C-spine and x-rays of the thoracic spine. No other point of bony tenderness affects his extremities. However, CT of the cervical spine does show a C7 fracture. The patient was seen and evaluated by neurosurgery, who recommended a cervical collar after an MRI. The patient will remain in the cervical collar during the day for comfort but can remove it for sleeping and eating. X-rays of the thoracic and lumbar spine are unremarkable, and trauma surgery has not recommended no further workup based on the location of the fracture. Pt denies hx of DT and seizures. The patient says his last use was yesterday evening. Pt denies any acute mental health or medical concerns but endorsed passive SI without a plan or intent in the ED. The patient was diagnosed with pneumonia on 6/6 and started on a 5-day course of Levaquin (to be completed by 6/11), along with nasal saline spray and an albuterol nebulizer. His lab results are as follows:  Pt ABT: 0 at 1703  COVID: Negative  Utox: Positive for THC  CMP: Abnormalities: Creatinine 0.65, Glucose 147  CBC: WNL   Lipase, INR, Magnesium, ABO/RH Type, and Screen WNL  HCG: N/A    A: Pt cooperated with the safety inspection and the admission interview. He was friendly at first, laughing a lot inappropriate at times during the interview. He became irritable when he found out the writer  could not deliver his facial herpes medication on demand. He used foul language to show his anger. He denied SI/HI, AVHs, constipation, and pain but endorsed anxiety, depression, poor sleep patterns, and decreased appetite. He does not work. Pt started drinking at 13, and it became a problem at 16. He lives at a shelter in [facility]. His , Keily, took his medications, and he did not know his doses.     R: Monitor and treat alcohol withdrawal with MSSA protocol using Valium. The Provider and Internal Medicine will see the Pt in the morning. The team will assist pt with finding healthy coping skills, setting daily goals, encouraging medication adherence, reporting side effects,  building rapport, and beginning the 15-minute safety checks.

## 2023-06-10 NOTE — PROGRESS NOTES
Patient has not required any valium for alcohol detox since 6/9 1515. All MSSA scores since that time have been less than 8. Pt is now removed from alcohol detox monitoring status. Await disposition likely to inpatient.

## 2023-06-10 NOTE — CONSULTS
"  [facility]  Internal Medicine Consult     Sahil Tejeda MRN# [id number]   Age: 23 year old YOB: 2000     Date of Admission: 6/9/2023  Date of Consult: 6/10/2023    Primary Care Provider: No Ref-Primary, Physician    Requesting Service: Behavioral Health - Shine Mccain MD  Reason for Consult: General Medical Evaluation      SUBJECTIVE   CC:   C7 fracture   Assessment and Plan/Recommendations:   Sahil Tejeda is a 23 year old male with history of GIB, depression, anxiety, chronic abdominal pain, and etoh abuse who was admitted to station 3A with etoh withdrawal     Alcohol dependency with acute withdrawal, anxiety, depression with SI: Per notes, drinking 8 40 ounce beers per day PTA. Drinking heavily x4 years PTA  - Management per psych     C7 fracture: Per ED note 6/9, \"He reports a recent 6 to 7 foot fall from a tree. He did have a CT head and C-spine as well as x-rays of the thoracic spine. No other point bony tenderness affects his extremities. On exam he is neurologically intact. CT head is unremarkable. CT of the cervical spine does show a C7 fracture. Patient was seen and evaluated by neurosurgery and recommended cervical MRI. MRI obtained. Neurosurgery has recommended that he remain in the cervical collar during the day for comfort. He remove the collar when sleeping and eating. X-rays of the thoracic and lumbar spine are unremarkable. Discussed with trauma surgery and recommended considering CTA however on discussion with neurosurgery is not needed based on the location of the fracture. Trauma surgery has not recommended any further work-up.\"  - Continue C collar during the day for comfort   - Follow up with NSGY on discharge  - Contact medicine with any clinical changes or concerns     Possible CAP: [place] [facility] [place] [facility] [place]. Diagnosed with CAP and started on 5 days of Levofloxacin. CXR 6/9 clear. Flu and COVID negative. WBC normal. VSS on room air. " "Etiology more likely viral URI vs viral LRTI, however, will complete course of abx as prescribed per OP team    H/o GIB: Per notes, h/o ulcers and GIB. Hgb stable. No PTA meds. Monitor and contact medicine with concerns     Chronic abdominal pain: Presented for many years. Abdominal US 6/9 no acute findings. At BL. Continue supportive cares. Contact medicine with changes in symptoms. Patient would benefit from close OP follow up with ongoing management     Currently, medically stable and internal medicine will sign off. Please contact if future questions or concerns arise. Thank you for the opportunity to be a part of this patient's care.      Cheryl Frank PA-C  Internal Medicine [facility] Hospitalist  Page job code [phone] (), [phone] or [phone] ([facility] and )  Text paging via [employer] is appreciated  Bruna 10, 2023         HPI:   Sahil Tejeda is a 23 year old male with history of GIB, depression, anxiety, chronic abdominal pain, and etoh abuse who was admitted to station 3A with etoh withdrawal     Patient reports feeling \"fine but tired.\" His appetite is poor. He has not been out of bed much this morning. Neck pain is stable. No new numbness, weakness, or tingling. Patient declines to answer further questions from writer and just wants to sleep. HPI terminated at his request       Past Medical History:   No past medical history on file.     Reviewed and updated in [employer].     Past Surgical History:    No past surgical history on file.   Unable to obtain     Social History:    Unable to obtain     Family History:   No family history on file.   Unable to obtain     Allergies:     Allergies   Allergen Reactions     Omnicef [Cefdinir] Hives         Medications:   Reviewed. Please see MAR     Review of Systems:   10 point ROS of systems including Constitutional, Eyes, Respiratory, Cardiovascular, Gastroenterology, Genitourinary, Integumentary, Muscularskeletal, Psychiatric were all negative except for pertinent " "positives noted in my HPI.    OBJECTIVE   Physical Exam:   Vitals were reviewed  Blood pressure 104/63, pulse 54, temperature 97.5  F (36.4  C), temperature source Temporal, resp. rate 16, height 1.88 m (6' 2\"), SpO2 98 %.  General: Alert and oriented x3, laying in bed. Briefly, but appropriately answers questions  HEENT: Eyes closed. MMM  Cardiovascular: RRR, S1S2. No murmur noted  Lungs: CTAB without wheezing or crackles   GI: Abdomen soft, non-tender with bowel sounds present. No guarding or rebound   Vascular: No peripheral edema, distal pulses palpable  Neurologic: No focal deficits. C collar at the side of the bed  Skin: No jaundice, rashes, or lesions        Data:        Lab Results   Component Value Date     06/09/2023    Lab Results   Component Value Date    CHLORIDE 102 06/09/2023    Lab Results   Component Value Date    BUN 9.9 06/09/2023      Lab Results   Component Value Date    POTASSIUM 3.4 06/09/2023    Lab Results   Component Value Date    CO2 23 06/09/2023    Lab Results   Component Value Date    CR 0.65 06/09/2023        Lab Results   Component Value Date    WBC 9.6 06/09/2023    HGB 16.2 06/09/2023    HCT 47.1 06/09/2023    MCV 94 06/09/2023     06/09/2023     Lab Results   Component Value Date    WBC 9.6 06/09/2023      "

## 2023-06-10 NOTE — ED NOTES
Per MD, pt okay to wear C-Collar for comfort. States pt does not need it to sleep/eat if uncomfortable but should wear during the day. Pt updated.

## 2023-06-10 NOTE — CONSULTS
"[facility]       NEUROSURGERY CONSULTATION NOTE    This consultation was requested by Dr. Monae from the [facility] ED service.    Reason for Consultation: C7 superior end plate compression fracture    HPI:    Sahil Tejeda is a 23 year old male, with PMH significant for alcohol abuse, STD- chlamydia infection, past social history of homelessness, who [resented to the [facility] for admission into the detox facility. As part of routine workup for medical clearance, it was found that the patient has a C7 superior end plate fracture for which Neurosurgery team was consulted for further evaluation and management. Patient reported that he fell from a tree, from a height of around 6-7 feet, 1.5 months back. Didn't complain of neck pain until now, with little neck pain. Informs that he has been having such pain in the past as well and is not new. He reports no weakness or numbness in either upper or lower extremities, or any bladder or bowel incontinence episodes or difficulty in ambulation/walking.      PAST MEDICAL HISTORY: No past medical history on file.    PAST SURGICAL HISTORY: No past surgical history on file.    FAMILY HISTORY: No family history on file.    SOCIAL HISTORY:   Social History     Tobacco Use     Smoking status: Not on file     Smokeless tobacco: Not on file   Substance Use Topics     Alcohol use: Not on file       MEDICATIONS:  No current outpatient medications on file.       Allergies:  Allergies   Allergen Reactions     Omnicef [Cefdinir] Hives       ROS: 10 point ROS of systems including Constitutional, Eyes, Respiratory, Cardiovascular, Gastroenterology, Genitourinary, Integumentary, Muscularskeletal, Psychiatric were all negative except for pertinent positives noted in my HPI.    Physical exam:   Blood pressure 125/77, pulse 85, temperature 98.6  F (37  C), temperature source Oral, resp. rate 17, height 1.88 m (6' 2\"), SpO2 98 " %.  General: awake and alert  HEENT: mild tenderness on palpation of lower neck  PULM: breathing comfortably on room air  NEUROLOGIC:  -- Awake; Alert; oriented x 3  -- Follows commands briskly  -- +repetition, calculation, and naming  -- Speech fluent, spontaneous. No aphasia or dysarthria.  -- no gaze preference. No apparent hemineglect.  Cranial Nerves:  -- visual fields full to confrontation, PERRL 3-2mm bilat and brisk, extraocular movements intact  -- face symmetrical, tongue midline  -- sensory V1-V3 intact bilaterally  -- palate elevates symmetrically, uvula midline  -- hearing grossly intact bilat  -- Trapezii 5/5 strength bilat symmetric  -- Cerebellar: Finger nose finger without dysmetria, intact rapid alternating motions bilaterally    Motor:  Normal bulk / tone; no tremor, rigidity, or bradykinesia.  No muscle wasting or fasciculations  No Pronator Drift     Delt Bi Tri Hand Flexion/  Extension Iliopsoas Quadriceps Hamstrings Tibialis Anterior Gastroc    C5 C6 C7 C8/T1 L2 L3 L4-S1 L4 S1   R 5 5 5 5 5 5 5 5 5   L 5 5 5 5 5 5 5 5 5   Sensory:  intact to LT x 4 extremities       Reflexes:       Bi Tri BR Rodríguez Pat Ach Bab     C5-6 C7-8 C6 UMN L2-4 S1 UMN   R 2+ 2+ 2+ Norm 2+ 2+ Norm   L 2+ 2+ 2+ Norm 2+ 2+ Norm      Gait: Normal.     IMAGING:  CT C spine: Acute C7 superior endplate fracture/compression with mild 33% height loss.    LABS:   Last Comprehensive Metabolic Panel:  Lab Results   Component Value Date     06/09/2023    POTASSIUM 3.4 06/09/2023    CHLORIDE 102 06/09/2023    CO2 23 06/09/2023    ANIONGAP 14 06/09/2023     (H) 06/09/2023    BUN 9.9 06/09/2023    CR 0.65 (L) 06/09/2023    GFRESTIMATED >90 06/09/2023    REY 9.7 06/09/2023         Lab Results   Component Value Date    WBC 9.6 06/09/2023     Lab Results   Component Value Date    RBC 5.02 06/09/2023     Lab Results   Component Value Date    HGB 16.2 06/09/2023     Lab Results   Component Value Date    HCT 47.1 06/09/2023      Lab Results   Component Value Date    MCV 94 06/09/2023     Lab Results   Component Value Date    MCH 32.3 06/09/2023     Lab Results   Component Value Date    MCHC 34.4 06/09/2023     Lab Results   Component Value Date    RDW 11.8 06/09/2023     Lab Results   Component Value Date     06/09/2023     INR   Date Value Ref Range Status   06/09/2023 1.07 0.85 - 1.15 Final      ASSESSMENT:    23/M with alcohol abuse, due for admission to detox center at [facility], with C7 compression fracture, neurologically intact, with mild tenderness on palpation in lower back.    RECOMMENDATIONS:    MRI C spine  C Collar for now  Upright C spine rays with C Collar  Neurosurgery will follow scan    Addendum:    MRI C spine reviewed. No ligamentous injury.  Continue wearing C collar for 6 weeks. Please page neurosurgery once UXR are done.    Follow in Neurosurgery clinic in 6 weeks with repeat UXR C spine- ordered, and visit scheduled.  Neurosurgery will follow peripherally.    Joel Dixon  Neurosurgery Resident, PGY-2    The patient was discussed with Dr. Hinson, neurosurgery chief resident.

## 2023-06-10 NOTE — PLAN OF CARE
Problem: Suicidal Behavior  Goal: Suicidal Behavior is Absent or Managed  Outcome: Progressing     Problem: Alcohol Withdrawal  Goal: Alcohol Withdrawal Symptom Control  Outcome: Progressing     Problem: Sleep Disturbance  Goal: Adequate Sleep/Rest  Outcome: Progressing   Goal Outcome Evaluation:    Plan of Care Reviewed With: patient      The Pt went to bed at about 0530, and his MSSA score was 4; hence, he received no Valium on this shift. The fifteen minutes safety checks are in progress with no related incidence. He received Valium 10 mg, a Banana bag, and IV Protonix in the ED. His C-Collar is in his room.

## 2023-06-10 NOTE — H&P
PSYCHIATRY HISTORY AND PHYSICAL         DATE OF SERVICE:   6/10/2023         CHIEF COMPLAINT:   Alcohol detox , depression, nightmares and flashbacks of PTSD, cervical fx           HISTORY OF PRESENT ILLNESS:     This is a 23 year old   male with alcohol use disorder. Evaluated in the ER on 6/9/2023 for detox.  Diagnosed with pneumonia on 6/6/20, placed on Levoquin 750 mg daily .to be done on 6/11/23.  Reported drinking 40 ounces of beer per day, last drink the night prior xto coming to the ER.No withdrawal seizures or DTs . Drinks daily x 4 years.Uses THC.No IV drugs.Reported fall of the tree 2 weeks ago , no LOC, reported headache , nausea and vomiting and neck pain . Has had  chronic abdominal pain , hx of ulcers, and GI bleed. Intermittant dark stool in the last few years.  During the interview with me he reports depression, anxiety , no suicidal thoughts now , had off and on suicidal thoughts, no plan before admission. He had suicide attempt with overdose in 2021 and he was hospitalized in [place] . Interested in psycho therapyst,detox and CD tx . Refuses medications.Has CM from [facility].  He says depression started when he was 10 y/o. He says his parents used drugs and alcohol. They did not have money for food and shelter, and he says they moved a lot and that caused anxiety and depression. He started using marihuana and alcohol and other drugs later, but not for few years. Now uses alcohol and marihuana. He has family history of depression and completed suicide. He refuses scheduled psychiatric medications, says he was on them without much help,  And he wants to be free of all chemicals including medications. He is anxious and wants Valium. I explained that it is given according withdrawal protocol, but will not be used for anxiety only.Seroquel and Zyprexa prn can be used for anxiety and agitation. We discussed risk of untreated depression and he thinks that without drugs and alcohol his mood will be  better. He says marihuana will be legal and he will use it. I pointed out that he says he does not want any chemicals, but he says he wants marihuana, but it is not as pure here as it is in [place], it is laced often, and he says he understands its danger. I explained the risk of marihuana for hallucinations, lethargy, lack of motivation. He says he wants psychotherapy , detox and treatment. Denies denise and hypomania.             CHEMICAL DEPENDENCY HISTORY:     History   Drug Use Not on file   marihuana now,started it at age 15, started other drugs at age 17:hallucinogens, adderall, cocaine, and stopped a few years ago    Social History    Substance and Sexual Activity      Alcohol use: Not on file    Daily x 4 years, 40 ounces of beer, last drink the night before     History   Smoking Status     Not on file   Smokeless Tobacco     Not on file     Chem dep treatments:no, but he wants CD tx  DUI:pt denies   Withdrawal seizures:pt denies          PAST PSYCHIATRIC HISTORY:     Hospitalizations:multiple, the last in January of 2021 in [place],  ECT: pt dnies  Suicide attempts:x 6, the last in January of 2021   Gun access: pt denies   Community supports: limited          PAST MEDICAL HISTORY:   No past medical history on file.  cervical fx post fall  S/P TBI 2 weeks ago  Gastric hemorrhage due to arthritis   Pneumonia, completing   tx with Levoquin    Medications:     folic acid  1 mg Oral Daily     levofloxacin  750 mg Oral Daily     multivitamin w/minerals  1 tablet Oral Daily     thiamine  100 mg Oral Daily     Medications as needed: atenolol, diazepam    ALLERGY: Omnicef [cefdinir]    History of traumatic brain injury:2 weeks ago fell of the tree while intoxicated. Multiple head injuries from skate bording  History of seizures:pt denies          MEDICATIONS:     No current outpatient medications on file.       Medication adherence: pt refuses to take medications   Medication side effects: no  Benefit: pt says  medications did not help in th past          ROS:   Neck pain, as per HPI, otherwise reminder of review of systems is negative for:general,eyes, ears, nose, throat, neck, respiratory, cardiovascular, gastrointestinal, genitourinary, musculo-sceletal,neurological, hematological,skin and endocrine system.         FAMILY HISTORY:   No family history on file.   Psychiatric:father had multiple hospitalizations   Suicide attempt:grandfather committed suicide   Chemical dependency:father   Diabetes:positive   Dyslipidemia:pt does not know          SOCIAL HISTORY:      Patient was born in [facility] and raised in [place],   Parents .  Siblings:one brother and sister   Relationship with family:contact with sister   Abuse:yes, reports nightmares and flashbacks   Education:GED  Employment: unemployed  Merital status:never   Children:no  Living situation:section 8 and free food   Legal problems:denies   Financial problems:yes, broke   service:no  Strength:does not identify  Weakness: does not identify  Hobby:music, skate boarding          MENTAL STATUS EXAM:   General: fair hygiene, cooperative  Orientation:to self, place, time  Speech: normal in rate and tone  Language: intact  Thought processes: concrete  Thought content: delusions and hallucinations  Suicidal thoughts: denies now, had off and on suicidal thoughts before admission   Homicidal thoughts: denies    Associations: connected  Affect: Anxious  Mood: depressed  Intellectual functioning: average  Fund of knowledge: consistent with education and experience   Attention and concentration: decreased  Memory: intact  Gait: steady  Psycho-motor activity: calm,no agitation  Muscle tone:no atrophy, no involuntary movement  Insight and judgment: limited, accepts help for alcoholism, but refuses medications for depression, but accepts psychotherapy         PHYSICAL EXAM:   Vitals: /63   Pulse 54   Temp 97.5  F (36.4  C) (Temporal)   Resp 16   Ht  "1.88 m (6' 2\")   SpO2 98%     General:patient is not in acute distress  HEENT: head is normocephalic/atraumatic,  Pupils equal ,round, extraocular movements intact  Nasal passages transient  Oropharynx clear  Neck: in a neck collar due to cervical fx   Extremities: No cyanosis, edema, clubbing  Skin: Normal color,  no rash  Neurological exam: Nonfocal, CN II to XII grossly intact, Strength 5/5 x 4, sensory grossly intact to light touch, coordination grossly intact         LABS:     personally reviewed     Lab Results   Component Value Date    WBC 9.6 06/09/2023    HGB 16.2 06/09/2023    HCT 47.1 06/09/2023     06/09/2023    ALT 13 06/09/2023    AST 18 06/09/2023     06/09/2023    BUN 9.9 06/09/2023    CO2 23 06/09/2023    INR 1.07 06/09/2023      [facility] Reference Range & Units 06/09/23 16:48 06/09/23 16:54 06/09/23 16:56 06/09/23 17:03   Sodium 136 - 145 mmol/L    139   Potassium 3.4 - 5.3 mmol/L    3.4   Chloride 98 - 107 mmol/L    102   Carbon Dioxide (CO2) 22 - 29 mmol/L    23   Urea Nitrogen 6.0 - 20.0 mg/dL    9.9   Creatinine 0.67 - 1.17 mg/dL    0.65 (L)   GFR Estimate >60 mL/min/1.73m2    >90   Calcium 8.6 - 10.0 mg/dL    9.7   Anion Gap 7 - 15 mmol/L    14   Magnesium 1.7 - 2.3 mg/dL    2.0   Albumin 3.5 - 5.2 g/dL    4.9   Protein Total 6.4 - 8.3 g/dL    8.2   Alkaline Phosphatase 40 - 129 U/L    89   ALT 10 - 50 U/L    13   AST 10 - 50 U/L    18   Bilirubin Total <=1.2 mg/dL    0.3   Glucose 70 - 99 mg/dL    147 (H)   Lipase 13 - 60 U/L    18   WBC 4.0 - 11.0 10e3/uL    9.6   Hemoglobin 13.3 - 17.7 g/dL    16.2   Hematocrit 40.0 - 53.0 %    47.1   Platelet Count 150 - 450 10e3/uL    218   RBC Count 4.40 - 5.90 10e6/uL    5.02   MCV 78 - 100 fL    94   MCH 26.5 - 33.0 pg    32.3   MCHC 31.5 - 36.5 g/dL    34.4   RDW 10.0 - 15.0 %    11.8   % Neutrophils %    70   % Lymphocytes %    23   % Monocytes %    5   % Eosinophils %    2   % Basophils %    0   Absolute Basophils 0.0 - 0.2 10e3/uL    " 0.0   Absolute Eosinophils 0.0 - 0.7 10e3/uL    0.1   Absolute Immature Granulocytes <=0.4 10e3/uL    0.0   Absolute Lymphocytes 0.8 - 5.3 10e3/uL    2.2   Absolute Monocytes 0.0 - 1.3 10e3/uL    0.5   % Immature Granulocytes %    0   Absolute Neutrophils 1.6 - 8.3 10e3/uL    6.6   Absolute NRBCs 10e3/uL    0.0   NRBCs per 100 WBC <1 /100    0   RBC Morphology     Confirmed RBC Indices   Platelet Morphology Automated Count Confirmed. Platelet morphology is normal.     Automated Count Confirmed. Platelet morphology is normal.   INR 0.85 - 1.15     1.07   ABO/Rh(D)     O POS   Antibody Screen Negative     Negative   SPECIMEN EXPIRATION DATE     [id number]   SARS CoV2 PCR Negative   Negative     Influenza A Negative   Negative     Influenza B Negative   Negative     Resp Syncytial Virus Negative   Negative     Alcohol Breath Test 0.00 - 0.01     0.000   Amphetamine Qual Urine Screen Negative    Screen Negative    Benzodiazepine Urine Screen Negative    Screen Negative    Opiates Qualitative Urine Screen Negative    Screen Negative    Cannabinoids Qual Urine Screen Negative    Screen Positive !    Barbiturates Qual Urine Screen Negative    Screen Negative    EKG 12-LEAD, TRACING ONLY  Rpt      Cocaine Urine Screen Negative    Screen Negative             Atrial Rate BPM 98     AK Interval ms 132     QRS Duration ms 102     QT ms 344     QTc ms 439     P Axis degrees 79     R AXIS degrees 104     T Axis degrees 60     Interpretation ECG  Sinus rhythm   Right atrial enlargement   Rightward axis   Pulmonary disease pattern   Incomplete right bundle branch block   Abnormal ECG      US ABDOMEN LIMITED  LOCATION: [facility]  DATE/TIME: 6/9/2023 6:28 PM CDT  INDICATION: RUQ and epigastric pain  COMPARISON: None.  TECHNIQUE: Limited abdominal ultrasound.  FINDINGS:   GALLBLADDER: Normal. No gallstones, wall thickening, or pericholecystic fluid. Negative sonographic Parikh's  sign.  BILE DUCTS: No biliary dilatation. The common duct measures 2 mm.  LIVER: Normal parenchyma with smooth contour. No focal mass.  RIGHT KIDNEY: No hydronephrosis.   PANCREAS: The visualized portions are normal.  No ascites.  IMPRESSION:                                          Normal limited abdominal ultrasound.     MR CERVICAL SPINE W/O CONTRAST  LOCATION: [facility]  DATE/TIME:2:16 AM CDT  IMPRESSION:  1.  Recent C7 superior endplate deformity with mild height loss and associated marrow edema.  2.  No convincing marrow edema at C5 and C6.  3.  No traumatic subluxation, cord injury or ligamentous injury.    CT CERVICAL SPINE W/O CONTRAST  LOCATION: [facility]  DATE/TIME: 6/9/2023 8:52 PM CDT  INDICATION: neck pain s p fall  COMPARISON: None.  TECHNIQUE: Routine CT Cervical Spine without IV contrast. Multiplanar reformats. Dose reduction techniques were used.   FINDINGS:   VERTEBRA: Spine straightening may be positional and/or related to muscle spasm. Acute superior endplate fracture at C7 with compression resulting in 33% anterior height loss and band of sclerosis undercutting the superior endplate. Additional bands of   sclerosis along the anterior superior endplate of C6 and, to a lesser extent, C5 are indeterminant and may represent milder trabecular impaction with mild C6 height loss also approximately 33%. Facets are intact.  CANAL/FORAMINA: Disc spaces are maintained. Mild uncovertebral hypertrophy asymmetric to the left at C6-C7. No high-grade spinal canal or foraminal stenosis.   PARASPINAL: No visible soft tissue abnormality.                                              IMPRESSION:                6/10/2023 1  1.  Acute C7 superior endplate fracture/compression with mild 33% height loss.  2.  Question additional milder trabecular impaction at C5 and C6 superior endplates with mild C6 vertebral body height  loss.   .  CT HEAD W/O CONTRAST  LOCATION: [facility]  DATE/TIME: 6/9/2023 6:46 PM CDT  INDICATION: Headache after a fall.  COMPARISON: None.   TECHNIQUE: Routine CT Head without IV contrast. Multiplanar reformats. Dose reduction techniques were used.   FINDINGS:  INTRACRANIAL CONTENTS: No acute intracranial hemorrhage, extraaxial collection, or mass effect. No evidence of an acute transcortical confluent infarct. Normal parenchymal attenuation. Normal ventricles and sulci for age. A 2.0 cm right posterior fossa   arachnoid cyst, less likely a prominent cisterna magna, a benign finding.  VISUALIZED ORBITS/SINUSES/MASTOIDS: No acute intraorbital finding. Mild diffuse mucosal thickening involving the visualized paranasal sinuses without air-fluid levels. No mastoid effusion.  BONES/SOFT TISSUES: No acute calvarial injury or significant scalp hematoma.  IMPRESSION:  1.  No acute intracranial finding.  2.  Mild diffuse paranasal sinus mucosal thickening without air-fluid levels. Correlate with history of sinusitis         DIAGNOSIS:     Alcohol use disorder severe dependency   Alcohol withdrawal with unspecified complications   Major depressive disorder recurrent moderate  Anxiety  PTSD    Principal Problem:      Alcohol use disorder severe dependency    Active Problem List:    Patient Active Problem List   Diagnosis     Alcohol withdrawal (H)           ASSESSMENT  AND TREATMENT  RECOMMENDATIONS:     Patient was admitted to [address] [place] [address] chemical dep/psychiatric unit for safety, stabilization and medication management. He has chronic substance abuse, depression, PTSD. He wants detox and CD tx and psychotherapy referral for depression, anxiety and PTSD. She does not want medications for those problems.We discussed risk of not taking  medications, versus taking them , but he says he does not want anything scheduled just prn for anxiety. We discussed risk of untreated mentall illness  and his family history of mental illness and suicide . He says he wants to be of any chemical, including prescribed medications. He says that marihuana helps, it will become legal, and he will use it instead of medication. I encouraged him to think again about taking medications.  He agrees with the following recommendations:    Medications:  Start MSSA protocol for alcohol withdrawal  Start Hydroxyzine 50 mg q 4 hours prn anxiety  Start Seroquel 25 to 50 mg qid prn for anxiety and agitation if hydroxyzine does not work  Start Zyprexa 5 mg qid prn for anxiety and agitation if Seroquel does not work   Start Zofran 4 mg qid prn for nausea   Start Compazine 5 mg q 6 hours prn if Zofran does not work  We discussed side effects, benefits and alternative treatment and patient agrees   Suicide precautions  Withdrawal precautions   Full code  Voluntary status    will obtain collateral information and  make outpatient referrals   Rule 25 for chemical dependency treatment  Patient will attend groups.  Staff will  provide emotional support.  Labs reviewed personally:  Consults: medicine consult for co management of alcohol withdrawal  Neurosurgery consult completed on 6/9/23 by Dr Dixon with recommendation for neck collar x 6 weeks and neurosurgery f/u in 6 weeks.    The patient was seen, medical record reviewed, care coordinated with the team.    This note was created with the help of Dragon  dictation system.  All typing and grammatical errors or context distortion are unintentional and inherent to software.    Antoinette Mcduffie MD    Initial Certification I certify that the inpatient psychiatric facility admission was medically necessary for treatment which could reasonably be expected to improve the patient s condition.       I estimate TBD days of hospitalization is necessary for proper treatment of the patient. My plans for post-hospital care this patient are medications, CDtx, psychotherapy  Antoinette Mcduffie MD

## 2023-06-10 NOTE — ED NOTES
Pt requesting to leave ED to smoke cigarette. Pt offered nicotine gum, pt declining. Pt insistent on leaving ED to smoke cigarette, stating they will check back in after. Md updated, states pt is not holdable and can leave AMA. Pt educated by RN and MD. Pt continues to state wanting to leave AMA. Pt signed AMA form. MD updated, states to keep encounter open in case pt decides to check back in.

## 2023-06-10 NOTE — ED NOTES
Pt transported to 3A via wheelchair. Admission discussed with pt. Pt agreeable to go. A&O x4. VSS. Ambulatory with steady gait. Belongings sent with pt.

## 2023-06-10 NOTE — PLAN OF CARE
"Goal Outcome Evaluation:    Plan of Care Reviewed With: patient         Problem: Alcohol Withdrawal  Goal: Alcohol Withdrawal Symptom Control  Outcome: Progressing     Problem: Suicidal Behavior  Goal: Suicidal Behavior is Absent or Managed  Outcome: Progressing      Patient is A&O x4, flat blunted affects earlier this morning wanted to sleep didn't want to be woken up  did not eat breakfast, encourage fluids, took all his supplements in am but not the Levaquin for pneumonia since he had nit had breakfast.  MSSA 1 and 5, not medicated this shift.  Denies SI/HI/SIB. Patient denies thoughts or plans of harming self or others. Contracts for safety while on unit.  Later at lunch pt C/O nausea. MD notified, ordered Zofran given with effect.   Pt was encouraged to take more bites food  to work well with the antibiotic and encouraged fluids , he tolerated well and was able to take his antibiotic for pneumonia.  Pt is supposed to wear a C -collar as per order, was not compliant with it its kept at bedside.   MD ordered an xray , done this shift.  Pt now  has a brighter affect with calm moods after lunch.  Visible on unit, interacting with peers in milieu.  Staff will continue to monitor  and update changes    /85   Pulse 80   Temp 97.6  F (36.4  C) (Oral)   Resp 16   Ht 1.88 m (6' 2\")   SpO2 98%       "

## 2023-06-10 NOTE — PHARMACY-ADMISSION MEDICATION HISTORY
Pharmacist Admission Medication History    Admission medication history is complete. The information provided in this note is only as accurate as the sources available at the time of the update.    Medication reconciliation/reorder completed by provider prior to medication history? No    Information Source(s): CareEverywhere/[facility] via N/A    Pertinent Information: Medication list populated using [facility] note. No other fill history    Changes made to PTA medication list:    Added: albuterol nebs, APAP, nasal spray, levofloxacin    Deleted: None    Changed: None           Allergies reviewed with patient and updates made in EHR: yes    Medication History Completed By: Sol Preston RP 6/10/2023 11:43 AM    Prior to Admission medications    Medication Sig Last Dose Taking? Auth Provider Long Term End Date   acetaminophen (TYLENOL) 325 MG tablet Take 325-650 mg by mouth every 6 hours as needed for mild pain  Yes Unknown, Entered By History     albuterol (ACCUNEB) 1.25 MG/3ML neb solution Take 1.25 mg by nebulization every 6 hours as needed for shortness of breath, wheezing or cough  Yes Unknown, Entered By History Yes    levofloxacin (LEVAQUIN) 750 MG tablet Take 750 mg by mouth daily  Yes Unknown, Entered By History  6/11/23   sodium chloride (OCEAN) 0.65 % nasal spray Spray 1 spray into both nostrils daily as needed for congestion  Yes Unknown, Entered By History

## 2023-06-10 NOTE — PROGRESS NOTES
A               Big BIN: Ear buds, Gray wallet, Pair of white shoes, Neck brace, Gray hoodie sweatshirt, Gray drawstring pants, Pair of black socks    Small BIN:[employer] insurance card, Keys, Fob, Cell phone    Security #[id number]: [place] card, [place] EBT card -(missing- 6/11/23 at 14:10pm) ,Social Security card, VISA card - [id number]    Admission:  I am responsible for any personal items that are not sent to the safe or pharmacy.  [facility] [place] is not responsible for loss, theft or damage of any property in my possession.    Signature:  _________________________________ Date: _______  Time: _____                                              Staff Signature:  ____________________________ Date: ________  Time: _____      2nd Staff person, if patient is unable/unwilling to sign:    Signature: ________________________________ Date: ________  Time: _____     Discharge:  [facility] has returned all of my personal belongings:    Signature: _________________________________ Date: ________  Time: _____                                          Staff Signature:  ____________________________ Date: ________  Time: _____

## 2023-06-11 PROBLEM — F41.9 ANXIETY: Status: ACTIVE | Noted: 2023-06-10

## 2023-06-11 PROBLEM — F43.10 PTSD (POST-TRAUMATIC STRESS DISORDER): Status: ACTIVE | Noted: 2023-06-10

## 2023-06-11 PROBLEM — F10.20 ALCOHOL USE DISORDER, SEVERE, DEPENDENCE (H): Status: ACTIVE | Noted: 2023-06-10

## 2023-06-11 PROBLEM — F10.939 ALCOHOL WITHDRAWAL, WITH UNSPECIFIED COMPLICATION (H): Status: ACTIVE | Noted: 2023-06-10

## 2023-06-11 PROBLEM — F33.1 MAJOR DEPRESSIVE DISORDER, RECURRENT EPISODE, MODERATE (H): Status: ACTIVE | Noted: 2023-06-10

## 2023-06-11 PROCEDURE — 128N000004 HC R&B CD ADULT

## 2023-06-11 PROCEDURE — 250N000013 HC RX MED GY IP 250 OP 250 PS 637: Performed by: PHYSICIAN ASSISTANT

## 2023-06-11 PROCEDURE — 250N000013 HC RX MED GY IP 250 OP 250 PS 637: Performed by: PSYCHIATRY & NEUROLOGY

## 2023-06-11 RX ORDER — QUETIAPINE FUMARATE 25 MG/1
25-50 TABLET, FILM COATED ORAL 4 TIMES DAILY PRN
Status: DISCONTINUED | OUTPATIENT
Start: 2023-06-11 | End: 2023-06-13 | Stop reason: HOSPADM

## 2023-06-11 RX ADMIN — ACYCLOVIR: 50 OINTMENT TOPICAL at 15:29

## 2023-06-11 RX ADMIN — ACYCLOVIR: 50 OINTMENT TOPICAL at 18:49

## 2023-06-11 RX ADMIN — PANTOPRAZOLE SODIUM 40 MG: 40 TABLET, DELAYED RELEASE ORAL at 07:12

## 2023-06-11 RX ADMIN — LEVOFLOXACIN 750 MG: 750 TABLET, FILM COATED ORAL at 08:50

## 2023-06-11 RX ADMIN — ACYCLOVIR: 50 OINTMENT TOPICAL at 07:13

## 2023-06-11 RX ADMIN — ACYCLOVIR: 50 OINTMENT TOPICAL at 09:02

## 2023-06-11 RX ADMIN — MULTIPLE VITAMINS W/ MINERALS TAB 1 TABLET: TAB at 09:03

## 2023-06-11 RX ADMIN — HYDROXYZINE HYDROCHLORIDE 50 MG: 50 TABLET, FILM COATED ORAL at 18:43

## 2023-06-11 RX ADMIN — THIAMINE HCL TAB 100 MG 100 MG: 100 TAB at 08:49

## 2023-06-11 RX ADMIN — NICOTINE POLACRILEX 2 MG: 2 LOZENGE ORAL at 17:33

## 2023-06-11 RX ADMIN — FOLIC ACID 1 MG: 1 TABLET ORAL at 08:42

## 2023-06-11 RX ADMIN — ACYCLOVIR: 50 OINTMENT TOPICAL at 12:59

## 2023-06-11 RX ADMIN — NICOTINE POLACRILEX 2 MG: 2 LOZENGE ORAL at 12:59

## 2023-06-11 RX ADMIN — NICOTINE POLACRILEX 2 MG: 2 LOZENGE ORAL at 15:28

## 2023-06-11 ASSESSMENT — ACTIVITIES OF DAILY LIVING (ADL)
ADLS_ACUITY_SCORE: 28
HYGIENE/GROOMING: INDEPENDENT
ORAL_HYGIENE: INDEPENDENT
ADLS_ACUITY_SCORE: 28
DRESS: INDEPENDENT
ADLS_ACUITY_SCORE: 28
ADLS_ACUITY_SCORE: 28

## 2023-06-11 NOTE — PLAN OF CARE
"Goal Outcome Evaluation:    Plan of Care Reviewed With: patient        Patient is A&O x 4, flat affect with calm moods this early shift. Took all his med's after breakfast and went back to his room to sleep.Pt is MICD. Denies  SI/HI/SIB, denies Suicidal thoughts  or plans. contracts for safety while on unit. Patient wanted to talk to the  regarding  his treatment, patient  with the care manager and discussed.Patient was contented with seeing them tomorrow.Patient later requested to talk to internal medicine regarding surgery for his fractured neck.They were notified will reassess pt tomorrow.  Patient is not compliant with wearing his C-Collar  Ate 100% of breakfast.Pt requested to have ensure, its ordered. Pt has a shower.  Staff will continue to monitor and update changes.  /81   Pulse 71   Temp 97.5  F (36.4  C) (Temporal)   Resp 16   Ht 1.88 m (6' 2\")   SpO2 96%      1400:Writer was notified that some of the pt's belongings were  given away but shortly after were returned. Writer went in pt's room with another staff to explain all that happened and how. Patient was calm, was a good listener with good eye contact and did not express any aggressive behaviors , in other words, patient took the whole situation lightly that all the staff was amazed on how patient handled the situation.Writer had checked with patient if was willing to take something to calm his moods bur declined. Patient was advised to call and put the cards on hold. Patient was reassured.Requested to listen to some music offered the headphone, he refused instead he wanted to use his phone to his phone to listen to music to calm him down, patient was redirected and was easily redirected. Pt stated wanted his phone to stop the cards, staff offered him his phone so he can cancel the cards instead he turned on his music to relax, staff told him to torn it down, did so. Pt was redirected to another room, away front the desk. Pt was " calm and easily  Directed, No agressive behavior noticed.Will continue to monitor.

## 2023-06-11 NOTE — PLAN OF CARE
Problem: Adult Behavioral Health Plan of Care  Goal: Plan of Care Review  Outcome: Progressing     Problem: Suicidal Behavior  Goal: Suicidal Behavior is Absent or Managed  Outcome: Progressing     Problem: Sleep Disturbance  Goal: Adequate Sleep/Rest  Outcome: Progressing   Goal Outcome Evaluation:    Plan of Care Reviewed With: patient        Pt slept fine. He is out of detox. He received no Valium.The 15-minutes safety checks were uneventful.

## 2023-06-11 NOTE — PROGRESS NOTES
"Checked in with pt who had been requesting CD assessment and referral to residential treatment. Pt however was asking for programs that would allow him full access to his music. Explained most programs won't allow full access to phones for privacy reasons but that he is usually able to access radio, mp3 players, ipods, etc and could have music that way. This was untenable to pt who reported the radio \"is bad\" and that if he can't have his music he cannot complete treatment. Pt began asking for discharge process and was referred to his RN for next steps.     Courtney Caputo, LPCC, LADC   "

## 2023-06-11 NOTE — PLAN OF CARE
"Goal Outcome Evaluation:         Pt MSSA is 5 at 1600. Pt was requesting valium for feeling bored and for anxiety. He was educated about when he would receive medication. He walked away using profanity then showered. Pt was requesting valium at 1530 when writer was on break. His MSSA at 1731 was 7 and continued to ask for valium. When pt was informed he could not have valium he went to his room and slammed his door. He was taken out of detox. He was offered his first dose of hydroxyzine 50 mg and he requested information. When he read hydroxyzine is an antihistimine he said \"that is like benadryl give me 50 pills.\" He declined hydroxyzine then agreed to take a dose at 1803. He said staff were \"not taking his concerns seriously. He said his stomach hurt, head hurt and his \"stomach was bleeding because he's an alcoholic.\" Writer provided reassurance and offered tylenol which pt declined. He said \"what's the point in being here if I am not getting valium.\" He was observed pacing in the hallway. He declined discussing coping skills for anxiety stating that pacing in the hallway is his coping skill.     He requested trazodone at 1900 and was informed he could not have it before 2000. Pt was asleep in bed before 2000. Order obtained for Seroquel and Zyprexa.     Pt is noncompliant with his neck brace. He reports \"going a month without so I am okay with a few more days.\" He insisted on this despite education about risks of not following provider recommendations for treatment.     Pt requested ointment for mouth sores. Pt was asleep when medication was available and reports insomnia so was not woken up for medication.     BP (!) 128/91 (BP Location: Left arm)   Pulse 101   Temp 97.5  F (36.4  C) (Oral)   Resp 16   Ht 1.88 m (6' 2\")   SpO2 98%                   "

## 2023-06-12 PROCEDURE — 250N000013 HC RX MED GY IP 250 OP 250 PS 637: Performed by: PSYCHIATRY & NEUROLOGY

## 2023-06-12 PROCEDURE — 128N000004 HC R&B CD ADULT

## 2023-06-12 PROCEDURE — 250N000009 HC RX 250: Performed by: PHYSICIAN ASSISTANT

## 2023-06-12 PROCEDURE — 99232 SBSQ HOSP IP/OBS MODERATE 35: CPT | Performed by: PSYCHIATRY & NEUROLOGY

## 2023-06-12 RX ORDER — GABAPENTIN 300 MG/1
300 CAPSULE ORAL 3 TIMES DAILY
Status: DISCONTINUED | OUTPATIENT
Start: 2023-06-12 | End: 2023-06-13 | Stop reason: HOSPADM

## 2023-06-12 RX ORDER — ESCITALOPRAM OXALATE 10 MG/1
10 TABLET ORAL DAILY
Status: DISCONTINUED | OUTPATIENT
Start: 2023-06-12 | End: 2023-06-13 | Stop reason: HOSPADM

## 2023-06-12 RX ADMIN — GABAPENTIN 300 MG: 300 CAPSULE ORAL at 20:25

## 2023-06-12 RX ADMIN — ESCITALOPRAM OXALATE 10 MG: 10 TABLET ORAL at 14:38

## 2023-06-12 RX ADMIN — TRAZODONE HYDROCHLORIDE 50 MG: 50 TABLET ORAL at 20:25

## 2023-06-12 RX ADMIN — HYDROXYZINE HYDROCHLORIDE 50 MG: 50 TABLET, FILM COATED ORAL at 12:48

## 2023-06-12 RX ADMIN — ACYCLOVIR: 50 OINTMENT TOPICAL at 07:01

## 2023-06-12 RX ADMIN — NICOTINE POLACRILEX 2 MG: 2 LOZENGE ORAL at 12:48

## 2023-06-12 RX ADMIN — PANTOPRAZOLE SODIUM 40 MG: 40 TABLET, DELAYED RELEASE ORAL at 06:51

## 2023-06-12 RX ADMIN — GABAPENTIN 300 MG: 300 CAPSULE ORAL at 14:38

## 2023-06-12 ASSESSMENT — ACTIVITIES OF DAILY LIVING (ADL)
ADLS_ACUITY_SCORE: 28
HYGIENE/GROOMING: INDEPENDENT
ADLS_ACUITY_SCORE: 28
ORAL_HYGIENE: INDEPENDENT
DRESS: INDEPENDENT
ADLS_ACUITY_SCORE: 28
HYGIENE/GROOMING: INDEPENDENT

## 2023-06-12 NOTE — PROGRESS NOTES
Triage & Transition Services, [address]     Sahil Tejeda  June 12, 2023    PLAN: Met with the patient this morning and the patient stated that he did not want treatment anymore however he knows he needs to work on his mental health. Patient was concerned about his wallet missing however this writer was informed that was resolved. This writer scheduled a telepsychiatry and a teletherapy appointment for the patient.     Type: Teletherapy  Date: Wednesday, 6/14/2023    Time: 12:00 pm - 1:00 pm  Provider: Madhavi Haney  Location: [facility], [address] [place] [address]  Phone: [phone]      Type: Telepsychiatry  Date: Thursday, 6/15/2023  Time: 1:00 pm - 2:00 pm  Provider: Refugio Armstrong  MSN  CNP,PMHNP,RN  Location: [facility], [address] [place] [id number]  Phone: [phone]      Sahil is currently admitted to [address]. Please see H&P for complete assessment information and therapist Progress Notes for additional clinical details.      worked with Tejas regarding patient's care today.         Shay Mayo  Triage & Transition Services - Mental Health and Addiction Service Line  [address] - Adult Inpatient Addiction Psychiatry Unit

## 2023-06-12 NOTE — PLAN OF CARE
Problem: Plan of Care - These are the overarching goals to be used throughout the patient stay.    Goal: Plan of Care Review  Description: The Plan of Care Review/Shift note should be completed every shift.  The Outcome Evaluation is a brief statement about your assessment that the patient is improving, declining, or no change.  This information will be displayed automatically on your shift note.  Outcome: Progressing   Goal Outcome Evaluation:    Plan of Care Reviewed With: patient                 Patient alert on approach. Overall, patient had a rough day in and out. First encounter, patient was searching for his clothing / sweat shirt. Writer went and joined PA with another RN and charge RN to help find the shirt. Patient's shirt was in the washer and should have been placed in the dryer, however, per PA it was not in the dryer. Patient began pacing the hallways back and forth, expressing frustrations and anger over shirt not being found and increasingly becoming loud and disruptive to peers and the group that was going on, the entire unit. Writer and other staffs tried to explain to patient that he was being helped and needed to cope, stay less disruptive and let staffs help him. He was not being receptive nor did he calm down until his clothing was found. Writer spoke with the Pas and began searching in the laundry room and the washer and found clothes in the washer. Patient immediately verified his clothing and was excited. He was offered prn nicotine gum which he took, but refused prn for anxiety during the search.    Later, writer was informed by another RN who said patient had come up to the desk unsatisfied about hi dinner and asked to take a picture of it with some hostility. When told he cannot have that access on the unit, he became irritable and paced the hallway with his food. He later came to request prn for anxiety from writer. Writer gave patient prn hydroxyzine 50 mg. Patient was on the phone in  "his room using slurring words and expressing frustration about his care on the unit to someone on the phone. He took the prn, however, refused to speak with writer about his concerns noting I\"i have nothing to say\". When writer went back to his room later to re-approach, patient was asleep. He has been sleeping. No other concerns noted.   "
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
## 2023-06-12 NOTE — PLAN OF CARE
"Goal Outcome Evaluation:    Plan of Care Reviewed With: patient          Pt is out of detox. He reports anxiety and received PRN hydroxyzine. He was yelling at the desk when staff gave pt the phone with his mom. Staff had mistaken mom for his . Pt stated he did not want his mom to know he was in the hospital and expressed fear of his 17 year old sister finding out. He was loud and using profanity at the desk. He initially declined his scheduled lexapro and gabapentin then agreed to take these in the afternoon. He requested and received information about lexapro. He said he did not want to take too many pills at that time since he just wanted hydroxyzine. He was redirectable to his room. He apologized for his outburst and showered to help calm himself down. He denies SI, SIB, HI, and AVH.    He expressed wanting to have outpatient treatment, psychiatry help, and PCP. He plans to set up a PCP appointment and case management was notified about pt's plans for after discharge. He expressed wanting to use coping skills before substances. He said he wants the option to use marijuana but that is his last resort choice.    /80 (BP Location: Left arm)   Pulse 106   Temp 97.5  F (36.4  C) (Temporal)   Resp 16   Ht 1.88 m (6' 2\")   SpO2 98%            "

## 2023-06-12 NOTE — PLAN OF CARE
Problem: Adult Behavioral Health Plan of Care  Goal: Plan of Care Review  Outcome: Progressing     Problem: Suicidal Behavior  Goal: Suicidal Behavior is Absent or Managed  Outcome: Progressing     Problem: Sleep Disturbance  Goal: Adequate Sleep/Rest  Outcome: Progressing   Goal Outcome Evaluation:    The patient has detoxed from alcohol, and he slept okay. The team conducted safety checks every 15 minutes with no related concerns.     07:10 Pt continued to be non-compliant regarding not wearing his neck C-Collar. He is currently awake and not wearing it. Pt reported no SI or SIB.

## 2023-06-12 NOTE — PROGRESS NOTES
Behavioral Team Discussion: (6/12/2023)    Continued Stay Criteria/Rationale: Patient admitted for Alcohol Use Disorder.  Plan: The following services will be provided to the patient; psychiatric assessment, medication management, therapeutic milieu, individual and group support, and skills groups.   Participants: 3A Provider: Dr. Cr Mcduffie MD; 3A RN: Frankie Willis RN; 3A CM's: [facility].  Summary/Recommendation: Providers will assess today for treatment recommendations, discharge planning, and aftercare plans. [facility] CM will meet with pt for discharge planning.   Medical/Physical: None   Precautions: None   Behavioral Orders   Procedures     Code 1 - Restrict to Unit     Code 2     Fall precautions     Routine Programming     As clinically indicated     Status 15     Every 15 minutes.     Suicide precautions     Patients on Suicide Precautions should have a Combination Diet ordered that includes a Diet selection(s) AND a Behavioral Tray selection for Safe Tray - with utensils, or Safe Tray - NO utensils       Withdrawal precautions     Rationale for change in precautions or plan: N/A  Progress: No Change.    ASAM Dimension Scale Ratings:  Dimension 1: 3 Client tolerates and giuliano with withdrawal discomfort poorly. Client has severe intoxication, such that the client endangers self or others, or intoxication has not abated with less intensive levels of services. Client displays severe signs and symptoms; or risk of severe, but manageable withdrawal; or withdrawal worsening despite detox at less intensive level.  Dimension 2: 1 Client tolerates and giuliano with physical discomfort and is able to get the services that the client needs.  Dimension 3: 2 Client has difficulty with impulse control and lacks coping skills. Client has thoughts of suicide or harm to others without means; however, the thoughts may interfere with participation in some treatment activities. Client has  difficulty functioning in significant life areas. Client has moderate symptoms of emotional, behavioral, or cognitive problems. Client is able to participate in most treatment activities.  Dimension 4: 3 Client displays inconsistent compliance, minimal awareness of either the client's addiction or mental disorder, and is minimally cooperative.  Dimension 5: 3 Client has poor recognition and understanding of relapse and recidivism issues and displays moderately high vulnerability for further substance use or mental health problems. Client has few coping skills and rarely applies coping skills.  Dimension 6: 3 Client is not engaged in structured, meaningful activity and the client's peers, family, significant other, and living environment are unsupportive, or there is significant criminal justice system involvement.

## 2023-06-13 VITALS
OXYGEN SATURATION: 96 % | DIASTOLIC BLOOD PRESSURE: 77 MMHG | SYSTOLIC BLOOD PRESSURE: 120 MMHG | RESPIRATION RATE: 16 BRPM | TEMPERATURE: 97.8 F | HEIGHT: 74 IN | WEIGHT: 116 LBS | HEART RATE: 84 BPM | BODY MASS INDEX: 14.89 KG/M2

## 2023-06-13 PROCEDURE — 250N000013 HC RX MED GY IP 250 OP 250 PS 637: Performed by: PSYCHIATRY & NEUROLOGY

## 2023-06-13 PROCEDURE — 99239 HOSP IP/OBS DSCHRG MGMT >30: CPT | Performed by: PSYCHIATRY & NEUROLOGY

## 2023-06-13 RX ORDER — ESCITALOPRAM OXALATE 10 MG/1
10 TABLET ORAL DAILY
Qty: 30 TABLET | Refills: 0 | Status: SHIPPED | OUTPATIENT
Start: 2023-06-13

## 2023-06-13 RX ORDER — FOLIC ACID 1 MG/1
1 TABLET ORAL DAILY
Qty: 30 TABLET | Refills: 0 | Status: SHIPPED | OUTPATIENT
Start: 2023-06-13

## 2023-06-13 RX ORDER — MULTIPLE VITAMINS W/ MINERALS TAB 9MG-400MCG
1 TAB ORAL DAILY
Qty: 30 TABLET | Refills: 0 | Status: SHIPPED | OUTPATIENT
Start: 2023-06-13

## 2023-06-13 RX ORDER — LANOLIN ALCOHOL/MO/W.PET/CERES
100 CREAM (GRAM) TOPICAL DAILY
Qty: 30 TABLET | Refills: 0 | Status: SHIPPED | OUTPATIENT
Start: 2023-06-13

## 2023-06-13 RX ORDER — HYDROXYZINE HYDROCHLORIDE 50 MG/1
50 TABLET, FILM COATED ORAL EVERY 4 HOURS PRN
Qty: 30 TABLET | Refills: 0 | Status: SHIPPED | OUTPATIENT
Start: 2023-06-13

## 2023-06-13 RX ORDER — TRAZODONE HYDROCHLORIDE 50 MG/1
50 TABLET, FILM COATED ORAL
Qty: 30 TABLET | Refills: 0 | Status: SHIPPED | OUTPATIENT
Start: 2023-06-13

## 2023-06-13 RX ORDER — POLYETHYLENE GLYCOL 3350 17 G
2 POWDER IN PACKET (EA) ORAL
Qty: 108 LOZENGE | Refills: 0 | Status: SHIPPED | OUTPATIENT
Start: 2023-06-13

## 2023-06-13 RX ORDER — PANTOPRAZOLE SODIUM 40 MG/1
40 TABLET, DELAYED RELEASE ORAL
Qty: 30 TABLET | Refills: 0 | Status: SHIPPED | OUTPATIENT
Start: 2023-06-13

## 2023-06-13 RX ORDER — ACETAMINOPHEN 325 MG/1
325-650 TABLET ORAL EVERY 6 HOURS PRN
Qty: 30 TABLET | Refills: 0 | Status: SHIPPED | OUTPATIENT
Start: 2023-06-13

## 2023-06-13 RX ORDER — GABAPENTIN 300 MG/1
300 CAPSULE ORAL 3 TIMES DAILY
Qty: 90 CAPSULE | Refills: 0 | Status: SHIPPED | OUTPATIENT
Start: 2023-06-13

## 2023-06-13 RX ADMIN — PANTOPRAZOLE SODIUM 40 MG: 40 TABLET, DELAYED RELEASE ORAL at 07:27

## 2023-06-13 RX ADMIN — ESCITALOPRAM OXALATE 10 MG: 10 TABLET ORAL at 08:09

## 2023-06-13 ASSESSMENT — ACTIVITIES OF DAILY LIVING (ADL)
LAUNDRY: WITH SUPERVISION
DRESS: INDEPENDENT
ADLS_ACUITY_SCORE: 28
ADLS_ACUITY_SCORE: 28
ORAL_HYGIENE: INDEPENDENT
ADLS_ACUITY_SCORE: 28
HYGIENE/GROOMING: INDEPENDENT

## 2023-06-13 NOTE — PLAN OF CARE
"Goal Outcome Evaluation:    Plan of Care Reviewed With: patient      Pt is calm, pleasant, and cooperative. He asked for details about discharge and responded appropriately when writer discussed the process. He declined gabapentin due to concern about feeling tired during the day. He received his lexapro. He declined vitamins due to feeling like he is getting enough nutrition from ensure. He denies SI, HI, AVH, and pain. He expressed feeling ready for discharge. He has treatment set up and follow up appointments made for mental health.      /77   Pulse 84   Temp 97.8  F (36.6  C) (Temporal)   Resp 16   Ht 1.88 m (6' 2\")   Wt 52.6 kg (116 lb)   SpO2 96%   BMI 14.89 kg/m               "

## 2023-06-13 NOTE — DISCHARGE SUMMARY
DISCHARGE SUMMARY    Sahil Tejeda     YOB: 2000   Date of admission: 6/9/2023      Date of discharge: 6/13/2023  Date of service: 6/13/2023    Identifications:  This is 22 y/o male with alcohol use disorder , depression and anxiety.  He was admitted for alcohol detox, depression, medication management   For details of history and physical on admission please refer to  my   admission dictation.    Hospital Course:  Patient was admitted to chem dep/psychiatric unit for safety, stabilization and medication management.  He reported starting marihuana at [age]ge 15, other drugs at age 17. He reported he used hallucinogens,cocaine, Adderal, and he stopped a few years ago.He reported using 40 ounces of beer daily x 4 years. He was put on [facility] alcohol withdrawal protocol, Thiamin and folic acid ant multivitamines. He tolerated withdrawal well. He first said he wanted IP treatment, then he said he wanted OP tx, he discussed it with , who made referrals. He denies cravings for alcohol. He refused anti craving medications Naltrexone, Antabuse and Campral.We discussed attending AA and working with sponsor. Discussed risk of alcohol for physical and mental health.His Utox was positive for marihuana.    He reported depression started at age 9.   He reported that  his parents used drugs and alcohol. They did not have money for food and shelter, and he says they moved a lot and that caused anxiety and depression. He self medicated with alcohol.He has family history of depression and completed suicide. He refused scheduled psychiatric medications, said that he did not have much help and he wants to be free of all chemicals including medications. He is anxious and wants Valium. I explained that it is given according withdrawal protocol, but will not be used for anxiety only.Seroquel and Zyprexa prn can be used for anxiety and agitation. We discussed risk of untreated depression and he thinks that without  "drugs and alcohol his mood will be better. He says marihuana will be legal and he will use it. I pointed out that he says he does not want any chemicals, but he says he wants marihuana, but it is not as pure here as it is in [place], it is laced often, and he says he understands its danger. I explained the risk of marihuana for hallucinations, lethargy, lack of motivation. He says he wants psychotherapy , detox and treatment. Denies denise and hypomania.    The next day he changed his mind and he said he wanted medications. He agreed with Lexapro for depression and anxiety, and Neurontin for anxiety.We discussed coping strategies with depression, anxiety and alcohol.  He says that he likes to listen to the music.  He likes to walk, he says he also does body scan relaxation.  He says that he starts from his toes and he goes up and relax his body.  He says that he also uses counting and that can help.  He also says that he stays around sober people.  He has a .  He says he is not in contact with his parents.  He says they are not good people and he is better off without them.    He said first day that he had nightmares and flashbacks of abuse, then the next day he said he did not have nightmatres. He refused Prazosin . He wants to see psychiatrist and psychotherapist and  will make appointments    He completed Levoquin for pneumonia.  He fell of the tree a few weeks ago and he has neck injury and he will continue to wear cervical collar and see a doctor in 6 weeks.      He had Medicine consult by Agnieszka Frank PA on 6/10/2023  \"Alcohol dependency with acute withdrawal, anxiety, depression with SI: Per notes, drinking 8 40 ounce beers per day PTA. Drinking heavily x4 years PTA  - Management per psych   C7 fracture: Per ED note 6/9, \"He reports a recent 6 to 7 foot fall from a tree. He did have a CT head and C-spine as well as x-rays of the thoracic spine. No other point bony tenderness " "affects his extremities. On exam he is neurologically intact. CT head is unremarkable. CT of the cervical spine does show a C7 fracture. Patient was seen and evaluated by neurosurgery and recommended cervical MRI. MRI obtained. Neurosurgery has recommended that he remain in the cervical collar during the day for comfort. He remove the collar when sleeping and eating. X-rays of the thoracic and lumbar spine are unremarkable. Discussed with trauma surgery and recommended considering CTA however on discussion with neurosurgery is not needed based on the location of the fracture. Trauma surgery has not recommended any further work-up.\"  - Continue C collar during the day for comfort   - Follow up with NSGY on discharge  - Contact medicine with any clinical changes or concerns    Possible CAP: Patient was seen at [facility] 6/6/2023. Diagnosed with CAP and started on 5 days of Levofloxacin. CXR 6/9 clear. Flu and COVID negative. WBC normal. VSS on room air. Etiology more likely viral URI vs viral LRTI, however, will complete course of abx as prescribed per OP team  H/o GIB: Per notes, h/o ulcers and GIB. Hgb stable. No PTA meds. Monitor and contact medicine with concerns   Chronic abdominal pain: Presented for many years. Abdominal US 6/9 no acute findings. At [facility]. Continue supportive cares. Contact medicine with changes in symptoms. Patient would benefit from close OP follow up with ongoing management   Currently, medically stable and internal medicine will sign off.\"    Sahil gradually improved and responded to medication and unit environment.He was compliant with attending groups and taking medications.He denies suicidal and homicidal ideas, delusions and hallucinations. He reports improved sleep and appetite, energy and concentration.He denies cravings for drugs and alcohol. He will take medications, attend OP appointment, call his providers with problems and return to the hospital if not feeling safe.     He agrees " "with outpatient treatment recommendations.    Patient progress toward goals:   Improved and safe for discharge.    Vital Signs:   /74 (BP Location: Right arm)   Pulse 85   Temp 98.4  F (36.9  C) (Oral)   Resp 16   Ht 1.88 m (6' 2\")   Wt 52.6 kg (116 lb)   SpO2 96%   BMI 14.89 kg/m       Mental status examination on discharge :  General: fair  hygiene, cooperative  Orientation: to self, place and time  Speech:normal in rate and tone  Language:intact  Thought process:concrete  Thought content: denies delusions and hallucinations  Suicidal thoughts: denies   Homicidal thoughts: denies   Associations:connected, no loosening or tanhentiality  Affect:brighter, hopefull  Mood:improved depression and anxiey  Attention and concentration:improved  Memory:intact  Fund of knowledge:consistent with education and experience   Intellectual functioning:average  Gait:steady  Psychomotor activity:calm, no agitation  Muscle strength and tone:no involntary movements  Insight and judgement:fair    Review of Systems:  As per history of present illness, otherwise reminder of   review of of systems is negative for: General, eyes, ears, nose, throat, neck, respiratory, cardiovascular, gastrointestinal, genitourinary, musculoskeletal, neurological, hematological, dermatological and endocrine system.    Laboratory results: Personally reviewed and discussed with the patient  Lab Results   Component Value Date    WBC 9.6 06/09/2023    HGB 16.2 06/09/2023    HCT 47.1 06/09/2023     06/09/2023    ALT 13 06/09/2023    AST 18 06/09/2023     06/09/2023    BUN 9.9 06/09/2023    CO2 23 06/09/2023    INR 1.07 06/09/2023       Latest Reference Range & Units 06/09/23 16:48 06/09/23 16:54 06/09/23 16:56 06/09/23 17:03   Sodium 136 - 145 mmol/L       139   Potassium 3.4 - 5.3 mmol/L       3.4   Chloride 98 - 107 mmol/L       102   Carbon Dioxide (CO2) 22 - 29 mmol/L       23   Urea Nitrogen 6.0 - 20.0 mg/dL       9.9   Creatinine " 0.67 - 1.17 mg/dL       0.65 (L)   GFR Estimate >60 mL/min/1.73m2       >90   Calcium 8.6 - 10.0 mg/dL       9.7   Anion Gap 7 - 15 mmol/L       14   Magnesium 1.7 - 2.3 mg/dL       2.0   Albumin 3.5 - 5.2 g/dL       4.9   Protein Total 6.4 - 8.3 g/dL       8.2   Alkaline Phosphatase 40 - 129 U/L       89   ALT 10 - 50 U/L       13   AST 10 - 50 U/L       18   Bilirubin Total <=1.2 mg/dL       0.3   Glucose 70 - 99 mg/dL       147 (H)   Lipase 13 - 60 U/L       18   WBC 4.0 - 11.0 10e3/uL       9.6   Hemoglobin 13.3 - 17.7 g/dL       16.2   Hematocrit 40.0 - 53.0 %       47.1   Platelet Count 150 - 450 10e3/uL       218   RBC Count 4.40 - 5.90 10e6/uL       5.02   MCV 78 - 100 fL       94   MCH 26.5 - 33.0 pg       32.3   MCHC 31.5 - 36.5 g/dL       34.4   RDW 10.0 - 15.0 %       11.8   % Neutrophils %       70   % Lymphocytes %       23   % Monocytes %       5   % Eosinophils %       2   % Basophils %       0   Absolute Basophils 0.0 - 0.2 10e3/uL       0.0   Absolute Eosinophils 0.0 - 0.7 10e3/uL       0.1   Absolute Immature Granulocytes <=0.4 10e3/uL       0.0   Absolute Lymphocytes 0.8 - 5.3 10e3/uL       2.2   Absolute Monocytes 0.0 - 1.3 10e3/uL       0.5   % Immature Granulocytes %       0   Absolute Neutrophils 1.6 - 8.3 10e3/uL       6.6   Absolute NRBCs 10e3/uL       0.0   NRBCs per 100 WBC <1 /100       0   RBC Morphology         Confirmed RBC Indices   Platelet Morphology Automated Count Confirmed. Platelet morphology is normal.        Automated Count Confirmed. Platelet morphology is normal.   INR 0.85 - 1.15        1.07   ABO/Rh(D)         O POS   Antibody Screen Negative        Negative   SPECIMEN EXPIRATION DATE         [id number]   SARS CoV2 PCR Negative    Negative       Influenza A Negative    Negative       Influenza B Negative    Negative       Resp Syncytial Virus Negative    Negative       Alcohol Breath Test 0.00 - 0.01        0.000   Amphetamine Qual Urine Screen Negative      Screen  Negative     Benzodiazepine Urine Screen Negative      Screen Negative     Opiates Qualitative Urine Screen Negative      Screen Negative     Cannabinoids Qual Urine Screen Negative      Screen Positive !     Barbiturates Qual Urine Screen Negative      Screen Negative     EKG 12-LEAD, TRACING ONLY   Rpt         Cocaine Urine Screen Negative      Screen Negative                   Atrial Rate BPM 98     MO Interval ms 132     QRS Duration ms 102     QT ms 344     QTc ms 439     P Axis degrees 79     R AXIS degrees 104     T Axis degrees 60     Interpretation ECG   Sinus rhythm   Right atrial enlargement   Rightward axis   Pulmonary disease pattern   Incomplete right bundle branch block   Abnormal ECG       US ABDOMEN LIMITED  LOCATION: [facility]  DATE/TIME: 6/9/2023 6:28 PM CDT  INDICATION: RUQ and epigastric pain  COMPARISON: None.  TECHNIQUE: Limited abdominal ultrasound.  FINDINGS:   GALLBLADDER: Normal. No gallstones, wall thickening, or pericholecystic fluid. Negative sonographic Parikh's sign.  BILE DUCTS: No biliary dilatation. The common duct measures 2 mm.  LIVER: Normal parenchyma with smooth contour. No focal mass.  RIGHT KIDNEY: No hydronephrosis.   PANCREAS: The visualized portions are normal.  No ascites.  IMPRESSION:                                          Normal limited abdominal ultrasound.     MR CERVICAL SPINE W/O CONTRAST  LOCATION: [facility]  DATE/TIME:2:16 AM CDT  IMPRESSION:  1.  Recent C7 superior endplate deformity with mild height loss and associated marrow edema.  2.  No convincing marrow edema at C5 and C6.  3.  No traumatic subluxation, cord injury or ligamentous injury.     CT CERVICAL SPINE W/O CONTRAST  LOCATION: [facility]  DATE/TIME: 6/9/2023 8:52 PM CDT  INDICATION: neck pain s p fall  COMPARISON: None.  TECHNIQUE: Routine CT Cervical Spine  without IV contrast. Multiplanar reformats. Dose reduction techniques were used.   FINDINGS:   VERTEBRA: Spine straightening may be positional and/or related to muscle spasm. Acute superior endplate fracture at C7 with compression resulting in 33% anterior height loss and band of sclerosis undercutting the superior endplate. Additional bands of   sclerosis along the anterior superior endplate of C6 and, to a lesser extent, C5 are indeterminant and may represent milder trabecular impaction with mild C6 height loss also approximately 33%. Facets are intact.  CANAL/FORAMINA: Disc spaces are maintained. Mild uncovertebral hypertrophy asymmetric to the left at C6-C7. No high-grade spinal canal or foraminal stenosis.   PARASPINAL: No visible soft tissue abnormality.                                              IMPRESSION:                6/10/2023 1  1.  Acute C7 superior endplate fracture/compression with mild 33% height loss.  2.  Question additional milder trabecular impaction at C5 and C6 superior endplates with mild C6 vertebral body height loss.   .  CT HEAD W/O CONTRAST  LOCATION: [facility]  DATE/TIME: 6/9/2023 6:46 PM CDT  INDICATION: Headache after a fall.  COMPARISON: None.   TECHNIQUE: Routine CT Head without IV contrast. Multiplanar reformats. Dose reduction techniques were used.   FINDINGS:  INTRACRANIAL CONTENTS: No acute intracranial hemorrhage, extraaxial collection, or mass effect. No evidence of an acute transcortical confluent infarct. Normal parenchymal attenuation. Normal ventricles and sulci for age. A 2.0 cm right posterior fossa   arachnoid cyst, less likely a prominent cisterna magna, a benign finding.  VISUALIZED ORBITS/SINUSES/MASTOIDS: No acute intraorbital finding. Mild diffuse mucosal thickening involving the visualized paranasal sinuses without air-fluid levels. No mastoid effusion.  BONES/SOFT TISSUES: No acute calvarial injury or significant  scalp hematoma.  IMPRESSION:  1.  No acute intracranial finding.  2.  Mild diffuse paranasal sinus mucosal thickening without air-fluid levels. Correlate with history of sinusitis             DISCHARGE DIAGNOSIS:      Alcohol use disorder severe dependency  Alcohol withdrawal with unspecified complications  Major depressive disorder recurrent moderate  Generalized anxiety disorder  PTSD cannot be ruled out    Patient Active Problem List   Diagnosis     Alcohol withdrawal (H)     Major depressive disorder, recurrent episode, moderate (H)     Anxiety     PTSD (post-traumatic stress disorder)     Alcohol use disorder, severe, dependence (H)     Alcohol withdrawal, with unspecified complication (H)       DISCHARGE PLAN    Patient will be discharged home. community.  Patient will take medications as prescribed. Patient will not adjust or stop taking medications without talking to providers.Emphasized importance of compliance with treatment for optimal response.   made outpatient appointments.  Patient will call providers with any problems between 2 visits. Emphasized importance of communication with providers.  Patient will go to the emergency room if not feeling safe, not able to function in the community,or if suicidal, homicidal or psychotic.  Patient will see his non psychiatric providers per their recommendation.  Patient will watch diet and exercise as tolerated.   Patient will abstain from drugs and alcohol.  Patient will not drive or operate heavy machinery, if sedated on medications or under influence of any substance.  We discussed diagnosis, prognosis, differential diagnosis and side effects and benefits of medications and alternative treatments and patient agrees with capacity to do so.      Discharge Medications:       Review of your medicines      START taking      Dose / Directions   benzocaine 20 % Gel gel  Commonly known as: ORAJEL MAXIMUM STRENGTH  Used for: Mouth sore      Take by mouth 4  times daily as needed for mouth sores  Quantity: 30 g  Refills: 0     escitalopram 10 MG tablet  Commonly known as: LEXAPRO      Dose: 10 mg  Take 1 tablet (10 mg) by mouth daily  Quantity: 30 tablet  Refills: 0     folic acid 1 MG tablet  Commonly known as: FOLVITE      Dose: 1 mg  Take 1 tablet (1 mg) by mouth daily  Quantity: 30 tablet  Refills: 0     gabapentin 300 MG capsule  Commonly known as: NEURONTIN  Used for: ETTA (generalized anxiety disorder)      Dose: 300 mg  Take 1 capsule (300 mg) by mouth 3 times daily  Quantity: 90 capsule  Refills: 0     hydrOXYzine 50 MG tablet  Commonly known as: ATARAX  Used for: ETTA (generalized anxiety disorder)      Dose: 50 mg  Take 1 tablet (50 mg) by mouth every 4 hours as needed for other (adjuvant pain)  Quantity: 30 tablet  Refills: 0     multivitamin w/minerals tablet      Dose: 1 tablet  Take 1 tablet by mouth daily  Quantity: 30 tablet  Refills: 0     nicotine 2 MG lozenge  Commonly known as: COMMIT  Used for: Nicotine use disorder      Dose: 2 mg  Place 1 lozenge (2 mg) inside cheek every hour as needed for smoking cessation  Quantity: 108 lozenge  Refills: 0     pantoprazole 40 MG EC tablet  Commonly known as: PROTONIX  Used for: Alcoholic gastritis with hemorrhage, unspecified chronicity      Dose: 40 mg  Take 1 tablet (40 mg) by mouth every morning (before breakfast)  Quantity: 30 tablet  Refills: 0     thiamine 100 MG tablet  Commonly known as: B-1      Dose: 100 mg  Take 1 tablet (100 mg) by mouth daily  Quantity: 30 tablet  Refills: 0     traZODone 50 MG tablet  Commonly known as: DESYREL  Used for: Insomnia due to mental disorder      Dose: 50 mg  Take 1 tablet (50 mg) by mouth nightly as needed for sleep (May repeat after 60 minutes)  Quantity: 30 tablet  Refills: 0        CONTINUE these medicines which have NOT CHANGED      Dose / Directions   acetaminophen 325 MG tablet  Commonly known as: TYLENOL  Used for: Pain      Dose: 325-650 mg  Take 1-2 tablets  (325-650 mg) by mouth every 6 hours as needed for mild pain  Quantity: 30 tablet  Refills: 0     sodium chloride 0.65 % nasal spray  Commonly known as: OCEAN  Used for: Dry nose      Dose: 1 spray  Spray 1 spray into both nostrils daily as needed for congestion  Quantity: 15 mL  Refills: 0        STOP taking    albuterol 1.25 MG/3ML neb solution  Commonly known as: ACCUNEB        levofloxacin 750 MG tablet  Commonly known as: LEVAQUIN              Where to get your medicines      These medications were sent to [facility] [place], [place] - [address]  [address] [address]    Phone: [phone]     acetaminophen 325 MG tablet    benzocaine 20 % Gel gel    escitalopram 10 MG tablet    folic acid 1 MG tablet    gabapentin 300 MG capsule    hydrOXYzine 50 MG tablet    multivitamin w/minerals tablet    nicotine 2 MG lozenge    pantoprazole 40 MG EC tablet    sodium chloride 0.65 % nasal spray    thiamine 100 MG tablet    traZODone 50 MG tablet           Diet: regular    Exercise: activity as tolerated    Condition at Discharge: stable    Coordination of Care:  Patient seen, chart reviewed, care coordinated with the team.    Total time   45 minutes spent on this discharge with more than 50% of time spent on coordination of care with staff on the unit, reviewing medical record, educating patient about diagnosis, differential diagnosis, prognosis, side effects and benefits of medications and alternative treatment.Educating patient about diet, exercise, abstinence from drugs and alcohol.Providing supportive therapy about above issues, entering orders and preparing documentation for discharge.    This note was created with the help of Dragon dictation system.  All grammatical/typing errors or context distortion are unintentional and inherent to software.    Antoinette Mcduffie MD

## 2023-06-13 NOTE — PLAN OF CARE
Problem: Suicidal Behavior  Goal: Suicidal Behavior is Absent or Managed  Outcome: Progressing     Problem: Sleep Disturbance  Goal: Adequate Sleep/Rest  Outcome: Progressing   Goal Outcome Evaluation:     The patient has detoxed from alcohol, and he slept for 7 hours. The team conducted safety checks every 15 minutes with no related concerns.

## 2023-06-13 NOTE — PLAN OF CARE
Problem: Plan of Care - These are the overarching goals to be used throughout the patient stay.    Goal: Plan of Care Review  Description: The Plan of Care Review/Shift note should be completed every shift.  The Outcome Evaluation is a brief statement about your assessment that the patient is improving, declining, or no change.  This information will be displayed automatically on your shift note.  Outcome: Progressing   Goal Outcome Evaluation:    Plan of Care Reviewed With: patient      Pt is out of alcohol detox.  Agitated on the previous shift but calm, pleasant  and polite this shift. Played chess with a PA, worked on word search puzzles. No behavioral issues. Denied SI, contracts for safety. Ate well, asked and got prn trazodone at bedtime. Wt : 116 lbs.

## 2023-12-12 ENCOUNTER — ANCILLARY PROCEDURE (OUTPATIENT)
Dept: GENERAL RADIOLOGY | Facility: CLINIC | Age: 23
End: 2023-12-12
Attending: STUDENT IN AN ORGANIZED HEALTH CARE EDUCATION/TRAINING PROGRAM
Payer: COMMERCIAL

## 2023-12-12 DIAGNOSIS — S12.691A OTHER CLOSED NONDISPLACED FRACTURE OF SEVENTH CERVICAL VERTEBRA, INITIAL ENCOUNTER (H): ICD-10-CM

## 2023-12-12 PROCEDURE — 72040 X-RAY EXAM NECK SPINE 2-3 VW: CPT | Performed by: RADIOLOGY

## 2024-02-22 ENCOUNTER — CARE COORDINATION (OUTPATIENT)
Dept: NEUROSURGERY | Facility: CLINIC | Age: 24
End: 2024-02-22